# Patient Record
Sex: MALE | Race: WHITE | NOT HISPANIC OR LATINO | Employment: FULL TIME | URBAN - METROPOLITAN AREA
[De-identification: names, ages, dates, MRNs, and addresses within clinical notes are randomized per-mention and may not be internally consistent; named-entity substitution may affect disease eponyms.]

---

## 2018-05-07 ENCOUNTER — EVALUATION (OUTPATIENT)
Dept: PHYSICAL THERAPY | Facility: CLINIC | Age: 63
End: 2018-05-07
Payer: COMMERCIAL

## 2018-05-07 ENCOUNTER — TRANSCRIBE ORDERS (OUTPATIENT)
Dept: PHYSICAL THERAPY | Facility: CLINIC | Age: 63
End: 2018-05-07

## 2018-05-07 DIAGNOSIS — Z96.653 STATUS POST TOTAL BILATERAL KNEE REPLACEMENT: ICD-10-CM

## 2018-05-07 DIAGNOSIS — Z96.653 PRESENCE OF BOTH ARTIFICIAL KNEE JOINTS: Primary | ICD-10-CM

## 2018-05-07 DIAGNOSIS — M25.562 ACUTE PAIN OF BOTH KNEES: Primary | ICD-10-CM

## 2018-05-07 DIAGNOSIS — M25.561 ACUTE PAIN OF BOTH KNEES: Primary | ICD-10-CM

## 2018-05-07 PROCEDURE — G8991 OTHER PT/OT GOAL STATUS: HCPCS

## 2018-05-07 PROCEDURE — 97162 PT EVAL MOD COMPLEX 30 MIN: CPT

## 2018-05-07 PROCEDURE — G8990 OTHER PT/OT CURRENT STATUS: HCPCS

## 2018-05-07 PROCEDURE — 97110 THERAPEUTIC EXERCISES: CPT

## 2018-05-07 PROCEDURE — 97140 MANUAL THERAPY 1/> REGIONS: CPT

## 2018-05-07 RX ORDER — ACETAMINOPHEN 325 MG/1
650 TABLET ORAL EVERY 6 HOURS PRN
COMMUNITY

## 2018-05-07 RX ORDER — PANTOPRAZOLE SODIUM 40 MG/1
40 TABLET, DELAYED RELEASE ORAL DAILY
COMMUNITY

## 2018-05-07 NOTE — PROGRESS NOTES
PT Evaluation     Today's date: 2018  Patient name: Virgil Rincon  : 1955  MRN: [id number]  Referring provider: Emery Shaffer MD  Dx:   Encounter Diagnosis     ICD-10-CM    1  Acute pain of both knees M25 561     M25 562    2  Status post total bilateral knee replacement Z96 [id number]        Start Time: 1400  Stop Time: 1510  Total time in clinic (min): 70 minutes    Assessment  Impairments: abnormal gait, abnormal or restricted ROM, activity intolerance, impaired balance, impaired physical strength, lacks appropriate home exercise program, pain with function and safety issue    Assessment details: Patient is a 64 y/o male who presents s/p a bilateral TKA performed on 18  Patient demonstrates functional mobility deficits secondary to increased pain, decreased AROM/PROM and diminished strength/endurance levels  Patient is a good rehabilitation candidate and will benefit from skilled PT intervention to address the above issues and help return the patient to their prior and/or modified level of function  Understanding of Dx/Px/POC: good   Prognosis: good    Goals  Short Term Goal    1  Patient will report a 50% reduction in their subjective pain report (VAS) by 4 weeks  2   Patient will demonstrate a 50% improvement in AROM/PROM by 4 weeks  3   Patient will demonstrate (at least) a 1/2 grade strength improvement after 4 weeks  4   Ambulation/Balance/Stairclimbing will be improved by at least 50% after 4 weeks  5   Patient will improve FOTO score by at least 10 points by 4 weeks    Long Term Goal    1  Patient will demonstrate IADL at the prior/maximal level of function  2   Patient will demonstrate recreational performance at the prior and/or maximal level  3   Patient will return to work at the prior and/or maximal level of function  4   Patient will demonstrate independence with their HEP        Plan  Patient would benefit from: skilled PT  Planned modality interventions: unattended electrical stimulation and cryotherapy  Planned therapy interventions: joint mobilization, manual therapy, massage, neuromuscular re-education, gait training, balance/weight bearing training, patient education, home exercise program, functional ROM exercises, therapeutic activities, therapeutic exercise, therapeutic training, strengthening and stretching  Frequency: 3x week  Duration in weeks: 8  Treatment plan discussed with: patient and family        Subjective Evaluation    History of Present Illness  Date of surgery: 2018  Mechanism of injury: surgery  Mechanism of injury: Patient is a 66 y/o male who presents s/p a B/L TKA performed on 18  He notes 3-5 years of chronic R/L knee pain due to prolonged standing/walking/stair climbing 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  He underwent two weeks of acute rehab at [place] [address]  and was released to out patient PT 18  Significant PMHx includes a L hip ITB release  Patient ambulates with a RW and notes that he is unable to be at his own home right now due to the [place] stairs [place]  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 [employer] as well as return to recreational walking  Not a recurrent problem   Quality of life: fair    Pain  Current pain ratin  At best pain ratin  At worst pain ratin  Location: Bilateral knees (diffuse)  Quality: sharp, pressure and squeezing  Relieving factors: change in position, ice, heat, medications and rest  Aggravating factors: standing, walking and stair climbing  Progression: no change    Social Support  Steps to enter house: yes  Stairs in house: yes   Lives in: multiple-level home  Lives with: spouse    Employment status: not working  Treatments  Discharged from (in last 30 days): inpatient hospitalization  Discharged from (in last 30 days) comments: Acute Rehab at [facility]         Patient Goals  Patient goals for therapy: decreased pain, increased motion, improved balance, increased strength, independence with ADLs/IADLs, return to sport/leisure activities, return to work and decreased edema          Objective     Observations   Left Knee   Positive for edema and incision  Right Knee   Positive for edema and incision  Additional Observation Details  Incisions secured via surgical staples and breathable tape  Patient notes that his staples will be removed 5/8/18  Palpation   Left   Tenderness of the gluteus medius, lateral gastrocnemius, medial gastrocnemius, obturator externus, piriformis and vastus medialis  Right Tenderness of the gluteus medius, lateral gastrocnemius, medial gastrocnemius, obturator externus, piriformis and vastus medialis  Tenderness     Left Hip   Tenderness in the greater trochanter  Left Knee   Tenderness in the lateral joint line and medial joint line  Right Knee   Tenderness in the lateral joint line and medial joint line       Neurological Testing     Sensation     Knee   Left Knee   Intact: light touch    Right Knee   Intact: light touch     Active Range of Motion   Left Hip   Normal active range of motion    Right Hip   Normal active range of motion  Left Knee   Flexion: 55 degrees with pain  Extension: 15 degrees with pain    Right Knee   Flexion: 80 degrees with pain  Extension: 10 degrees with pain    Passive Range of Motion   Left Knee   Flexion: 60 degrees with pain  Extension: 10 degrees with pain    Right Knee   Flexion: 90 degrees with pain  Extension: 5 degrees with pain    Strength/Myotome Testing     Left Hip   Planes of Motion   Flexion: 4-  Extension: 4  Abduction: 3+  Adduction: 4  External rotation: 4-  Internal rotation: 4-    Right Hip   Planes of Motion   Flexion: 4  Extension: 4  Abduction: 4  Adduction: 4  External rotation: 4-  Internal rotation: 4-    Left Knee   Flexion: 4-  Extension: 3+  Quadriceps contraction: poor    Right Knee   Flexion: 4  Extension: 3+  Quadriceps contraction: poor    Left Ankle/Foot   Dorsiflexion: 5  Plantar flexion: 5  Inversion: 4  Eversion: 4    Right Ankle/Foot   Dorsiflexion: 5  Plantar flexion: 5  Inversion: 4  Eversion: 4    Ambulation   Weight-Bearing Status   Weight-Bearing Status (Left): full weight bearing   Weight-Bearing Status (Right): full weight-bearing    Assistive device used: front-wheeled walker    Ambulation: Level Surfaces   Ambulation with assistive device: independent  Ambulation without assistive device: minimum assist    Observational Gait   Gait: antalgic   Decreased walking speed, stride length, left step length and right step length  Quality of Movement During Gait   Trunk  Forward lean  Knee    Knee (Left): Positive stiff knee  Knee (Right): Positive stiff knee         Flowsheet Rows      Most Recent Value   PT/OT G-Codes   Current Score  15   Projected Score  54   Assessment Type  Evaluation   G code set  Other PT/OT Primary   Other PT Primary Current Status ()  CM   Other PT Primary Goal Status ()  CK          Precautions: S/P BTKA, DM, OA    Daily Treatment Diary     Manual  5/7            PROM AS            IASTM/STM AS            Patellar mobs             Scar mobs                               Exercise Diary              R bike             HR/TR             Standing march             SLR flex/abd standing             Standing heel curl             Tandem walk             Lateral walk             Cone taps             SLS             Mini squat             Sit to stands             Supine SLR             Bridge/S/L bridge             Quad sets w/ heel bolster             SAQ/LAQ             Strap ext str 4x30:B/L            Strap flex str 4x30:B/L                                                       Modalities              E stim+ CP PRN

## 2018-05-07 NOTE — LETTER
May 7, 2018    MD Niharika Gómez  [address] [facility]    Patient: Leonie Carrero   YOB: 1955   Date of Visit: 2018     Encounter Diagnosis     ICD-10-CM    1  Acute pain of both knees M25 561     M25 562    2  Status post total bilateral knee replacement [id number]        Dear Dr Winkler Abt:    Please review the attached Plan of Care from [facility] recent visit  Please verify that you agree therapy should continue by signing the attached document and sending it back to our office  If you have any questions or concerns, please don't hesitate to call  Sincerely,    Divine Serna, PT      Referring Provider:      I certify that I have read the below Plan of Care and certify the need for these services furnished under this plan of treatment while under my care  MD Niharika Gómez  [facility] [address]  VIA Facsimile: [phone]          PT Evaluation     Today's date: 2018  Patient name: Leonie Carrero  : 1955  MRN: [id number]  Referring provider: Ashton Skiff, MD  Dx:   Encounter Diagnosis     ICD-10-CM    1  Acute pain of both knees M25 561     M25 562    2  Status post total bilateral knee replacement Z96 [id number]        Start Time: 1400  Stop Time: 1510  Total time in clinic (min): 70 minutes    Assessment  Impairments: abnormal gait, abnormal or restricted ROM, activity intolerance, impaired balance, impaired physical strength, lacks appropriate home exercise program, pain with function and safety issue    Assessment details: Patient is a 66 y/o male who presents s/p a bilateral TKA performed on 18  Patient demonstrates functional mobility deficits secondary to increased pain, decreased AROM/PROM and diminished strength/endurance levels    Patient is a good rehabilitation candidate and will benefit from skilled PT intervention to address the above issues and help return the patient to their prior and/or modified level of function  Understanding of Dx/Px/POC: good   Prognosis: good    Goals  Short Term Goal    1  Patient will report a 50% reduction in their subjective pain report (VAS) by 4 weeks  2   Patient will demonstrate a 50% improvement in AROM/PROM by 4 weeks  3   Patient will demonstrate (at least) a 1/2 grade strength improvement after 4 weeks  4   Ambulation/Balance/Stairclimbing will be improved by at least 50% after 4 weeks  5   Patient will improve FOTO score by at least 10 points by 4 weeks    Long Term Goal    1  Patient will demonstrate IADL at the prior/maximal level of function  2   Patient will demonstrate recreational performance at the prior and/or maximal level  3   Patient will return to work at the prior and/or maximal level of function  4   Patient will demonstrate independence with their HEP  Plan  Patient would benefit from: skilled PT  Planned modality interventions: unattended electrical stimulation and cryotherapy  Planned therapy interventions: joint mobilization, manual therapy, massage, neuromuscular re-education, gait training, balance/weight bearing training, patient education, home exercise program, functional ROM exercises, therapeutic activities, therapeutic exercise, therapeutic training, strengthening and stretching  Frequency: 3x week  Duration in weeks: 8  Treatment plan discussed with: patient and family        Subjective Evaluation    History of Present Illness  Date of surgery: 4/25/2018  Mechanism of injury: surgery  Mechanism of injury: Patient is a 66 y/o male who presents s/p a B/L TKA performed on 4/25/18  He notes 3-5 years of chronic R/L knee pain due to prolonged standing/walking/stair climbing 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[employer] as well as return to recreational walking  Not a recurrent problem   Quality of life: fair    Pain  Current pain ratin  At best pain ratin  At worst pain ratin  Location: Bilateral knees (diffuse)  Quality: sharp, pressure and squeezing  Relieving factors: change in position, ice, heat, medications and rest  Aggravating factors: standing, walking and stair climbing  Progression: no change    Social Support  Steps to enter house: yes  Stairs in house: yes   Lives in: multiple-level home  Lives with: spouse    Employment status: not working  Treatments  Discharged from (in last 30 days): inpatient hospitalization  Discharged from (in last 30 days) comments: Acute Rehab at [facility]    Patient Goals  Patient goals for therapy: decreased pain, increased motion, improved balance, increased strength, independence with ADLs/IADLs, return to sport/leisure activities, return to work and decreased edema          Objective     Observations   Left Knee   Positive for edema and incision  Right Knee   Positive for edema and incision  Additional Observation Details  Incisions secured via surgical staples and breathable tape  Patient notes that his staples will be removed 18  Palpation   Left   Tenderness of the gluteus medius, lateral gastrocnemius, medial gastrocnemius, obturator externus, piriformis and vastus medialis  Right Tenderness of the gluteus medius, lateral gastrocnemius, medial gastrocnemius, obturator externus, piriformis and vastus medialis  Tenderness     Left Hip   Tenderness in the greater trochanter  Left Knee   Tenderness in the lateral joint line and medial joint line  Right Knee   Tenderness in the lateral joint line and medial joint line       Neurological Testing     Sensation Knee   Left Knee   Intact: light touch    Right Knee   Intact: light touch     Active Range of Motion   Left Hip   Normal active range of motion    Right Hip   Normal active range of motion  Left Knee   Flexion: 55 degrees with pain  Extension: 15 degrees with pain    Right Knee   Flexion: 80 degrees with pain  Extension: 10 degrees with pain    Passive Range of Motion   Left Knee   Flexion: 60 degrees with pain  Extension: 10 degrees with pain    Right Knee   Flexion: 90 degrees with pain  Extension: 5 degrees with pain    Strength/Myotome Testing     Left Hip   Planes of Motion   Flexion: 4-  Extension: 4  Abduction: 3+  Adduction: 4  External rotation: 4-  Internal rotation: 4-    Right Hip   Planes of Motion   Flexion: 4  Extension: 4  Abduction: 4  Adduction: 4  External rotation: 4-  Internal rotation: 4-    Left Knee   Flexion: 4-  Extension: 3+  Quadriceps contraction: poor    Right Knee   Flexion: 4  Extension: 3+  Quadriceps contraction: poor    Left Ankle/Foot   Dorsiflexion: 5  Plantar flexion: 5  Inversion: 4  Eversion: 4    Right Ankle/Foot   Dorsiflexion: 5  Plantar flexion: 5  Inversion: 4  Eversion: 4    Ambulation   Weight-Bearing Status   Weight-Bearing Status (Left): full weight bearing   Weight-Bearing Status (Right): full weight-bearing    Assistive device used: front-wheeled walker    Ambulation: Level Surfaces   Ambulation with assistive device: independent  Ambulation without assistive device: minimum assist    Observational Gait   Gait: antalgic   Decreased walking speed, stride length, left step length and right step length  Quality of Movement During Gait   Trunk  Forward lean  Knee    Knee (Left): Positive stiff knee  Knee (Right): Positive stiff knee         Flowsheet Rows      Most Recent Value   PT/OT G-Codes   Current Score  15   Projected Score  54   Assessment Type  Evaluation   G code set  Other PT/OT Primary   Other PT Primary Current Status ()  CM   Other PT Primary Goal Status ()  CK          Precautions: S/P BTKA, DM, OA    Daily Treatment Diary     Manual  5/7            PROM AS            IASTM/STM AS            Patellar mobs             Scar mobs                               Exercise Diary              R bike             HR/TR             Standing march             SLR flex/abd standing             Standing heel curl             Tandem walk             Lateral walk             Cone taps             SLS             Mini squat             Sit to stands             Supine SLR             Bridge/S/L bridge             Quad sets w/ heel bolster             SAQ/LAQ             Strap ext str 4x30:B/L            Strap flex str 4x30:B/L                                                       Modalities              E stim+ CP PRN impaired postural control/impaired coordination

## 2018-05-07 NOTE — LETTER
May 7, 2018    MD Niharika Longo  [address] [facility]    Patient: Alisson Cole   YOB: 1955   Date of Visit: 5/7/2018     Encounter Diagnosis     ICD-10-CM    1  Acute pain of both knees M25 561     M25 562    2  Status post total bilateral knee replacement [id number]        Dear Dr Crane Sayer:    Please review the attached Plan of Care from [facility] recent visit  Please verify that you agree therapy should continue by signing the attached document and sending it back to our office  If you have any questions or concerns, please don't hesitate to call  Sincerely,    Marino Skinner, PT      Referring Provider:      I certify that I have read the below Plan of Care and certify the need for these services furnished under this plan of treatment while under my care  MD Niharika Longo    [facility] [address]  VIA Facsimile: [phone]          No notes on file

## 2018-05-09 ENCOUNTER — OFFICE VISIT (OUTPATIENT)
Dept: PHYSICAL THERAPY | Facility: CLINIC | Age: 63
End: 2018-05-09
Payer: COMMERCIAL

## 2018-05-09 DIAGNOSIS — M25.561 ACUTE PAIN OF BOTH KNEES: Primary | ICD-10-CM

## 2018-05-09 DIAGNOSIS — M25.562 ACUTE PAIN OF BOTH KNEES: Primary | ICD-10-CM

## 2018-05-09 DIAGNOSIS — Z96.653 STATUS POST TOTAL BILATERAL KNEE REPLACEMENT: ICD-10-CM

## 2018-05-09 PROCEDURE — 97014 ELECTRIC STIMULATION THERAPY: CPT | Performed by: PHYSICAL THERAPIST

## 2018-05-09 PROCEDURE — 97110 THERAPEUTIC EXERCISES: CPT | Performed by: PHYSICAL THERAPIST

## 2018-05-09 NOTE — PROGRESS NOTES
Daily Note     Today's date: 2018  Patient name: Vannessa Medley  : 1955  MRN: [id number]  Referring provider: Ary Ma MD  Dx: No diagnosis found  Visit 2    Subjective: Pt reports he felt fine after IE and he has the normal soreness  He had his staples removed yesterday  Objective: See treatment diary below  The patient performed the following as per flow sheet:   --Therapeutic Exercises for 38 minutes  --Cryotherapy & E-stim for 15 minutes  Assessment: Pt progress is good  He did not do any active exercises at IE , so he was given a HEP this time and requested to do the exercises every day he is not here for formul PT  Plan: Continue per plan of care         Daily Treatment Diary     Manual              PROM 5'            MFR 5 5'                                                       Exercise Diary              QS 15x:10            Ball squeeze 15X:10            hIP er W/ tb 15X:10            SAQ 2X15            Flex 6'            Calf raises 2x15            Leg press 2x15                                                                                                                                                                                         Modalities              CP/Stim 15' Noted.

## 2018-05-11 ENCOUNTER — OFFICE VISIT (OUTPATIENT)
Dept: PHYSICAL THERAPY | Facility: CLINIC | Age: 63
End: 2018-05-11
Payer: COMMERCIAL

## 2018-05-11 DIAGNOSIS — M25.562 ACUTE PAIN OF BOTH KNEES: Primary | ICD-10-CM

## 2018-05-11 DIAGNOSIS — Z96.653 STATUS POST TOTAL BILATERAL KNEE REPLACEMENT: ICD-10-CM

## 2018-05-11 DIAGNOSIS — M25.561 ACUTE PAIN OF BOTH KNEES: Primary | ICD-10-CM

## 2018-05-11 PROCEDURE — 97014 ELECTRIC STIMULATION THERAPY: CPT | Performed by: PHYSICAL THERAPIST

## 2018-05-11 PROCEDURE — 97110 THERAPEUTIC EXERCISES: CPT | Performed by: PHYSICAL THERAPIST

## 2018-05-11 NOTE — PROGRESS NOTES
Daily Note     Today's date: 2018  Patient name: Melanie Badillo  : 1955  MRN: [id number]  Referring provider: Nayeli Regalado MD  Dx:   Encounter Diagnosis     ICD-10-CM    1  Acute pain of both knees M25 561     M25 562    2  Status post total bilateral knee replacement Z96 [id number]                 Visit 3    Subjective: Pt reports he feels a lot of pain today and he is concerned about the raised appearance of the left incision  Objective: See treatment diary below  The patient performed the following as per flow sheet:   --Therapeutic Exercises for 34 minutes  --Cryotherapy & E-stim for 15 minutes  Assessment: Pt progress is where it should be at this point  He is actively doing more including PT, so the pain is going to be worse  The knees are bending well, the right more so than the left  The left incision has an area of non-congruence of the incision edges, not a dehiscence  The incisions are closed and healing well  We discussed the importance of using a topical antibiotic gel to keep the incisions moist and clean  He was in agreement with these recommendations  Plan: Continue per plan of care       Manual                       PROM 5'                     MFR 5 5'                                                                                                   Exercise Diary                        QS 15x:10                     Ball squeeze 15X:10                     Hip ER w/ TB 15X:10                     SAQ 2X15                     Flex 6'                     Calf raises 2x15                     Leg press 2x15                     LAQ 2x15                                                                                                                                                                                                                                                                                                                           Modalities                        CP/Stim 15'

## 2018-05-14 ENCOUNTER — OFFICE VISIT (OUTPATIENT)
Dept: PHYSICAL THERAPY | Facility: CLINIC | Age: 63
End: 2018-05-14
Payer: COMMERCIAL

## 2018-05-14 DIAGNOSIS — M25.561 ACUTE PAIN OF BOTH KNEES: Primary | ICD-10-CM

## 2018-05-14 DIAGNOSIS — M25.562 ACUTE PAIN OF BOTH KNEES: Primary | ICD-10-CM

## 2018-05-14 DIAGNOSIS — Z96.653 STATUS POST TOTAL BILATERAL KNEE REPLACEMENT: ICD-10-CM

## 2018-05-14 PROCEDURE — 97014 ELECTRIC STIMULATION THERAPY: CPT | Performed by: PHYSICAL THERAPIST

## 2018-05-14 PROCEDURE — 97110 THERAPEUTIC EXERCISES: CPT | Performed by: PHYSICAL THERAPIST

## 2018-05-14 NOTE — PROGRESS NOTES
Daily Note     Today's date: 2018  Patient name: Ford Talley  : 1955  MRN: [id number]  Referring provider: Angel Vora MD  Dx:   Encounter Diagnosis     ICD-10-CM    1  Acute pain of both knees M25 561     M25 562    2  Status post total bilateral knee replacement Z96 [id number]                 Visit 4    Subjective: Pt reports he feels pretty sore most of the time and he is also very depressed because he can't do anything on his own  Objective: See treatment diary below  The patient performed the following as per flow sheet:   --Therapeutic Exercises for 34 minutes  --Cryotherapy & E-stim for 15 minutes  Assessment: Pt progress is good  He is doing well at this point  There is a lot of self-pity going on, but I feel is he gets a few succusses under his belt, he will feel better, and then have a better perspective on the realization that this will not last forever  Plan: Continue per plan of care       Manual                     PROM 5'  5                   MFR 5 5'  5                                                                                                 Exercise Diary                        QS 15x:10  15x                   Ball squeeze 15X:10  15x                   Hip ER w/ TB 15X:10  15x                   SAQ 2X15  30x                   Flex 6'  6                   Calf raises 2x15  30x                   Leg press 2x15  30x                   LAQ 2x15  30x                    HSS   3x                                                                                                                                                                                                                                                                                                 Modalities                        CP/Stim 13'  15

## 2018-05-16 ENCOUNTER — OFFICE VISIT (OUTPATIENT)
Dept: PHYSICAL THERAPY | Facility: CLINIC | Age: 63
End: 2018-05-16
Payer: COMMERCIAL

## 2018-05-16 DIAGNOSIS — M25.562 ACUTE PAIN OF BOTH KNEES: Primary | ICD-10-CM

## 2018-05-16 DIAGNOSIS — Z96.653 STATUS POST TOTAL BILATERAL KNEE REPLACEMENT: ICD-10-CM

## 2018-05-16 DIAGNOSIS — M25.561 ACUTE PAIN OF BOTH KNEES: Primary | ICD-10-CM

## 2018-05-16 PROCEDURE — 97014 ELECTRIC STIMULATION THERAPY: CPT

## 2018-05-16 PROCEDURE — 97110 THERAPEUTIC EXERCISES: CPT

## 2018-05-16 PROCEDURE — 97140 MANUAL THERAPY 1/> REGIONS: CPT

## 2018-05-16 NOTE — PROGRESS NOTES
Daily Note     Today's date: 2018  Patient name: Celi Jaimes  : 1955  MRN: [id number]  Referring provider: Christine Eagle MD  Dx:   Encounter Diagnosis     ICD-10-CM    1  Acute pain of both knees M25 561     M25 562    2  Status post total bilateral knee replacement Z96 653        Start Time: 928  Stop Time: 1113  Total time in clinic (min): 105 minutes    Visit 5    Subjective: "I'm OK  I just wish my right leg would be better than it is "      Objective: See treatment diary below  The patient performed the following as per flow sheet:   --Therapeutic Exercises for 40 minutes  --Cryotherapy & E-stim for 15 minutes  Assessment:  Patient demonstrated fair tolerance for today's activities, particularly manual intervention techniques  He needed moderate cuing for form and activity progression as well as staying on task  Patient would benefit from continued skilled PT intervention to address his remaining deficits  Plan: Continue per plan of care       Manual                   PROM 5'  5  10'                 MFR 5 5'  5  5'                                                                                               Exercise Diary                        QS 15x:10  15x  15x                 Ball squeeze 15X:10  15x  15x                 Hip ER w/ TB 15X:10  15x  15x                 SAQ 2X15  30x  30x                 Flex 6'  6  6'                 Calf raises 2x15  30x  30x                 Leg press 2x15  30x  30x                 LAQ 2x15  30x  30x                  HSS   3x 3x ea                                                                                                                                                                                                                                                                                               Modalities                        CP/Stim 13'  15  15'

## 2018-05-18 ENCOUNTER — OFFICE VISIT (OUTPATIENT)
Dept: PHYSICAL THERAPY | Facility: CLINIC | Age: 63
End: 2018-05-18
Payer: COMMERCIAL

## 2018-05-18 DIAGNOSIS — M25.561 ACUTE PAIN OF BOTH KNEES: Primary | ICD-10-CM

## 2018-05-18 DIAGNOSIS — Z96.653 STATUS POST TOTAL BILATERAL KNEE REPLACEMENT: ICD-10-CM

## 2018-05-18 DIAGNOSIS — M25.562 ACUTE PAIN OF BOTH KNEES: Primary | ICD-10-CM

## 2018-05-18 PROCEDURE — 97014 ELECTRIC STIMULATION THERAPY: CPT | Performed by: PHYSICAL THERAPIST

## 2018-05-18 PROCEDURE — 97110 THERAPEUTIC EXERCISES: CPT | Performed by: PHYSICAL THERAPIST

## 2018-05-18 NOTE — PROGRESS NOTES
Daily Note     Today's date: 2018  Patient name: Elsi Collins  : 1955  MRN: [id number]  Referring provider: Lizbeth Estevez MD  Dx:   Encounter Diagnosis     ICD-10-CM    1  Acute pain of both knees M25 561     M25 562    2  Status post total bilateral knee replacement Z96 653                 Visit 5    Subjective: Pt reports he feels really sore most of the time and he can't sleep due to pain  Objective: See treatment diary below  The patient performed the following as per flow sheet:   --Therapeutic Exercises for 50 minutes  --Cryotherapy & E-stim for 15 minutes  Assessment: Tolerated treatment well  Patient demonstrated fatigue post treatment, exhibited good technique with therapeutic exercises and would benefit from continued PT      Plan: Continue per plan of care       Manual                   PROM 5'  5  5                 MFR 5 5'  5  5                                                                                               Exercise Diary                        QS 15x:10  15x  15x                  Ball squeeze 15X:10  15x  15x                 Hip ER w/ TB 15X:10  15x  15x                 SAQ 2X15  30x  30x                 Flex 6'  6  6                 Calf raises 2x15  30x  30x                 Leg press 2x15  30x  30x                 LAQ 2x15  30x  30x                  HSS   3x  3x                  Bike      5'                                                                                                                                                                                                                                                                       Modalities                        CP/Stim 13  15  15

## 2018-05-21 ENCOUNTER — OFFICE VISIT (OUTPATIENT)
Dept: PHYSICAL THERAPY | Facility: CLINIC | Age: 63
End: 2018-05-21
Payer: COMMERCIAL

## 2018-05-21 DIAGNOSIS — Z96.653 STATUS POST TOTAL BILATERAL KNEE REPLACEMENT: ICD-10-CM

## 2018-05-21 DIAGNOSIS — M25.562 ACUTE PAIN OF BOTH KNEES: Primary | ICD-10-CM

## 2018-05-21 DIAGNOSIS — M25.561 ACUTE PAIN OF BOTH KNEES: Primary | ICD-10-CM

## 2018-05-21 PROCEDURE — 97110 THERAPEUTIC EXERCISES: CPT

## 2018-05-21 PROCEDURE — 97140 MANUAL THERAPY 1/> REGIONS: CPT

## 2018-05-21 NOTE — PROGRESS NOTES
Daily Note     Today's date: 2018  Patient name: Cassie Salmeron  : 1955  MRN: [id number]  Referring provider: Cosme Loza MD  Dx:   Encounter Diagnosis     ICD-10-CM    1  Acute pain of both knees M25 561     M25 562    2  Status post total bilateral knee replacement Z96 [id number]        Start Time: 0930  Stop Time: 1050  Total time in clinic (min): 80 minutes  Visit 6    Subjective: Patient has been trying to transition [place]  He notes increased pain this AM secondary to "going up and down my stairs all weekend "      Objective: See treatment diary below  The patient performed the following as per flow sheet:   --Therapeutic Exercises for 50 minutes  --Cryotherapy & E-stim for 15 minutes  --Manual for 10 minutes      Assessment:  Patient exhibited poor tolerance for manual treatment today noting increased pain from his stair-climbing activities  He noted selective tissue tension and muscular fatigue with all PREs and would benefit from continued skilled PT intervention to address his remaining deficits  Plan: Continue per plan of care       Manual                 PROM 5'  5  5  5               MFR 5 5'  5  5  5                                                                                             Exercise Diary                        QS 15x:10  15x  15x   15x               Ball squeeze 15X:10  15x  15x  15x               Hip ER w/ TB 15X:10  15x  15x  15x               SAQ 2X15  30x  30x  30x               Flex 6'  6  6  6'               Calf raises 2x15  30x  30x  30x               Leg press 2x15  30x  30x  30x               LAQ 2x15  30x  30x  30x                HSS   3x  3x  3x                Bike        5                                                                                                                                                                                                                                                                   Modalities         5/21               CP/Stim 15'  15  15  15'

## 2018-05-23 ENCOUNTER — APPOINTMENT (OUTPATIENT)
Dept: PHYSICAL THERAPY | Facility: CLINIC | Age: 63
End: 2018-05-23
Payer: COMMERCIAL

## 2018-05-24 ENCOUNTER — OFFICE VISIT (OUTPATIENT)
Dept: PHYSICAL THERAPY | Facility: CLINIC | Age: 63
End: 2018-05-24
Payer: COMMERCIAL

## 2018-05-24 DIAGNOSIS — M25.561 ACUTE PAIN OF BOTH KNEES: Primary | ICD-10-CM

## 2018-05-24 DIAGNOSIS — Z96.653 STATUS POST TOTAL BILATERAL KNEE REPLACEMENT: ICD-10-CM

## 2018-05-24 DIAGNOSIS — M25.562 ACUTE PAIN OF BOTH KNEES: Primary | ICD-10-CM

## 2018-05-24 PROCEDURE — 97110 THERAPEUTIC EXERCISES: CPT | Performed by: PHYSICAL THERAPIST

## 2018-05-24 PROCEDURE — 97014 ELECTRIC STIMULATION THERAPY: CPT | Performed by: PHYSICAL THERAPIST

## 2018-05-24 NOTE — PROGRESS NOTES
Daily Note     Today's date: 2018  Patient name: Melanie Badillo  : 1955  MRN: [id number]  Referring provider: Nayeli Regalado MD  Dx:   Encounter Diagnosis     ICD-10-CM    1  Acute pain of both knees M25 561     M25 562    2  Status post total bilateral knee replacement Z96 653                 Visit 7  Subjective: Pt reports he feels sore most of the time  He is worried about the left knee and how stiff it is  Objective: See treatment diary below  The patient performed the following as per flow sheet:   --Therapeutic Exercises for 50 minutes  --Cryotherapy & E-stim for 15 minutes  Assessment: Tolerated treatment well   Patient demonstrated fatigue post treatment, exhibited good technique with therapeutic exercises and would benefit from continued PT      Plan: Continue per plan of care         Manual                 PROM 5'  5  5  5               MFR 5 5'  5  5  5                                                                                             Exercise Diary                        QS 15x:10  15x  15x   15x               Ball squeeze 15X:10  15x  15x  15x               Hip ER w/ TB 15X:10  15x  15x  15x               SAQ 2X15  30x  30x  30x               Flex 6'  6  6  6               Calf raises 2x15  30x  30x  30x               Leg press 2x15  30x  30x  30x               LAQ 2x15  30x  30x  30x                HSS   3x  3x  3x                Bike      5'  5                                                                                                                                                                                                                                                                     Modalities                        CP/Stim 15  15  15  15

## 2018-05-25 ENCOUNTER — OFFICE VISIT (OUTPATIENT)
Dept: PHYSICAL THERAPY | Facility: CLINIC | Age: 63
End: 2018-05-25
Payer: COMMERCIAL

## 2018-05-25 DIAGNOSIS — M25.561 ACUTE PAIN OF BOTH KNEES: Primary | ICD-10-CM

## 2018-05-25 DIAGNOSIS — Z96.653 STATUS POST TOTAL BILATERAL KNEE REPLACEMENT: ICD-10-CM

## 2018-05-25 DIAGNOSIS — M25.562 ACUTE PAIN OF BOTH KNEES: Primary | ICD-10-CM

## 2018-05-25 PROCEDURE — 97014 ELECTRIC STIMULATION THERAPY: CPT | Performed by: PHYSICAL THERAPIST

## 2018-05-25 PROCEDURE — 97110 THERAPEUTIC EXERCISES: CPT | Performed by: PHYSICAL THERAPIST

## 2018-05-25 NOTE — PROGRESS NOTES
Daily Note     Today's date: 2018  Patient name: Chiara Bolden  : 1955  MRN: [id number]  Referring provider: Harriett Santos MD  Dx:   Encounter Diagnosis     ICD-10-CM    1  Acute pain of both knees M25 561     M25 562    2  Status post total bilateral knee replacement Z96 653                 Visit 8    Subjective: Pt reports he is feeling sore again today and he is pretty tired as well  Objective: See treatment diary below  The patient performed the following as per flow sheet:   --Therapeutic Exercises for 50 minutes  --Cryotherapy & E-stim for 15 minutes  Assessment: Tolerated treatment well  Patient demonstrated fatigue post treatment, exhibited good technique with therapeutic exercises and would benefit from continued PT      Plan: Continue per plan of care       Manual               PROM 5'  5  5  5  5             MFR 5 5'  5  5  5  5                                                                                           Exercise Diary                        QS 15x:10  15x  15x   15x  15x             Ball squeeze 15X:10  15x  15x  15x  15x             Hip ER w/ TB 15X:10  15x  15x  15x  15x             SAQ 2X15  30x  30x  30x  30x             Flex 6'  6  6  6  6             Calf raises 2x15  30x  30x  30x  30x             Leg press 2x15  30x  30x  30x  30x             LAQ 2x15  30x  30x  30x  30x              HSS   3x  3x  3x  3x              Bike      5'  5  6                                                                                                                                                                                                                                                                   Modalities                        CP/Stim 15'  15  15  15  15

## 2018-05-29 ENCOUNTER — OFFICE VISIT (OUTPATIENT)
Dept: PHYSICAL THERAPY | Facility: CLINIC | Age: 63
End: 2018-05-29
Payer: COMMERCIAL

## 2018-05-29 DIAGNOSIS — M25.561 ACUTE PAIN OF BOTH KNEES: Primary | ICD-10-CM

## 2018-05-29 DIAGNOSIS — M25.562 ACUTE PAIN OF BOTH KNEES: Primary | ICD-10-CM

## 2018-05-29 DIAGNOSIS — Z96.653 STATUS POST TOTAL BILATERAL KNEE REPLACEMENT: ICD-10-CM

## 2018-05-29 PROCEDURE — 97110 THERAPEUTIC EXERCISES: CPT | Performed by: PHYSICAL THERAPIST

## 2018-05-29 PROCEDURE — 97014 ELECTRIC STIMULATION THERAPY: CPT | Performed by: PHYSICAL THERAPIST

## 2018-05-29 NOTE — PROGRESS NOTES
Daily Note     Today's date: 2018  Patient name: Heather Banegas  : 1955  MRN: [id number]  Referring provider: Liz Adair MD  Dx:   Encounter Diagnosis     ICD-10-CM    1  Acute pain of both knees M25 561     M25 562    2  Status post total bilateral knee replacement Z96 653                 Visit 9    Subjective: Pt reports he was doing the stationary bike 2x per day as requested at his last visit  Objective: See treatment diary below   The patient performed the following as per flow sheet:   --Therapeutic Exercises for 50 minutes  --Cryotherapy & E-stim for 15 minutes  Assessment: Tolerated treatment well  Patient demonstrated fatigue post treatment, exhibited good technique with therapeutic exercises and would benefit from continued PT      Plan: Continue per plan of care       Manual             PROM 5'  5  5  5  5  5           MFR 5 5'  5  5  5  5  5                                                                                         Exercise Diary                        QS 15x:10  15x  15x   15x  15x  15x           Ball squeeze 15X:10  15x  15x  15x  15x  15x           Hip ER w/ TB 15X:10  15x  15x  15x  15x  15x           SAQ 2X15  30x  30x  30x  30x  30x           Flex 6'  6  6  6  6  6           Calf raises 2x15  30x  30x  30x  30x  30x           Leg press 2x15  30x  30x  30x  30x  30x           LAQ 2x15  30x  30x  30x  30x  30x            HSS   3x  3x  3x  3x  3x            Bike      5'  5  6  6                                                                                                                                                                                                                                                                 Modalities                        CP/Stim 15'  15  15  15  15  15

## 2018-05-30 ENCOUNTER — OFFICE VISIT (OUTPATIENT)
Dept: PHYSICAL THERAPY | Facility: CLINIC | Age: 63
End: 2018-05-30
Payer: COMMERCIAL

## 2018-05-30 DIAGNOSIS — Z96.653 STATUS POST TOTAL BILATERAL KNEE REPLACEMENT: ICD-10-CM

## 2018-05-30 DIAGNOSIS — M25.561 ACUTE PAIN OF BOTH KNEES: Primary | ICD-10-CM

## 2018-05-30 DIAGNOSIS — M25.562 ACUTE PAIN OF BOTH KNEES: Primary | ICD-10-CM

## 2018-05-30 PROCEDURE — 97110 THERAPEUTIC EXERCISES: CPT | Performed by: PHYSICAL THERAPIST

## 2018-05-30 PROCEDURE — 97014 ELECTRIC STIMULATION THERAPY: CPT | Performed by: PHYSICAL THERAPIST

## 2018-05-30 NOTE — PROGRESS NOTES
Daily Note     Today's date: 2018  Patient name: Elsi Collins  : 1955  MRN: [id number]  Referring provider: Lizbeth Estevez MD  Dx:   Encounter Diagnosis     ICD-10-CM    1  Acute pain of both knees M25 561     M25 562    2  Status post total bilateral knee replacement Z96 653                 Visit 10    Subjective: Pt reports he was really sore yesterday and he did nothing except sleep after PT  Objective: See treatment diary below  The patient performed the following as per flow sheet:   --Therapeutic Exercises for 53 minutes  --Cryotherapy & E-stim for 15 minutes  Assessment: Pt progress is very slow  As suspected, Dedra Diamond is doing very little outside of PT  He was advised to get off of the walker because he was too dependent on it and also his wife was insistent on him using it, when it is actually hindering his progress  Plan: Continue per plan of care       Manual           PROM 5'  5  5  5  5  5  5         MFR 5 5'  5  5  5  5  5  5                                                                                       Exercise Diary                        QS 15x:10  15x  15x   15x  15x  15x  15x         Ball squeeze 15X:10  15x  15x  15x  15x  15x  15x         Hip ER w/ TB 15X:10  15x  15x  15x  15x  15x  15x         SAQ 2X15  30x  30x  30x  30x  30x  30x         Flex 6'  6  6  6  6  6  6         Calf raises 2x15  30x  30x  30x  30x  30x  30x         Leg press 2x15  30x  30x  30x  30x  30x  30x         LAQ 2x15  30x  30x  30x  30x  30x  30x          HSS   3x  3x  3x  3x  3x  3x          Bike      5'  5  6  6  6          Ball squats              15x                                                                                                                                                                                                                                       Modalities                        CP/Stim 15'  15  15  15  15  15  15

## 2018-06-01 ENCOUNTER — OFFICE VISIT (OUTPATIENT)
Dept: PHYSICAL THERAPY | Facility: CLINIC | Age: 63
End: 2018-06-01
Payer: COMMERCIAL

## 2018-06-01 DIAGNOSIS — M25.562 ACUTE PAIN OF BOTH KNEES: Primary | ICD-10-CM

## 2018-06-01 DIAGNOSIS — M25.561 ACUTE PAIN OF BOTH KNEES: Primary | ICD-10-CM

## 2018-06-01 DIAGNOSIS — Z96.653 STATUS POST TOTAL BILATERAL KNEE REPLACEMENT: ICD-10-CM

## 2018-06-01 PROCEDURE — 97014 ELECTRIC STIMULATION THERAPY: CPT | Performed by: PHYSICAL THERAPIST

## 2018-06-01 PROCEDURE — 97110 THERAPEUTIC EXERCISES: CPT | Performed by: PHYSICAL THERAPIST

## 2018-06-01 NOTE — PROGRESS NOTES
Daily Note     Today's date: 2018  Patient name: Elsi Collins  : 1955  MRN: [id number]  Referring provider: Lizbeth Estevez MD  Dx:   Encounter Diagnosis     ICD-10-CM    1  Acute pain of both knees M25 561     M25 562    2  Status post total bilateral knee replacement Z96 [id number]                 Visit 12    Subjective: Pt reports he is feeling better today but the pain at night is what he c/o the most       Objective: See treatment diary below  The patient performed the following as per flow sheet:   --Therapeutic Exercises for 53 minutes  --Cryotherapy & E-stim for 15 minutes  Assessment: Tolerated treatment well  Patient demonstrated fatigue post treatment, exhibited good technique with therapeutic exercises and would benefit from continued PT      Plan: Continue per plan of care       Manual         PROM 5'  5  5  5  5  5  5  5       MFR 5 5'  5  5  5  5  5  5  5                                                                                     Exercise Diary                        QS 15x:10  15x  15x   15x  15x  15x  15x  15x       Ball squeeze 15X:10  15x  15x  15x  15x  15x  15x  15x       Hip ER w/ TB 15X:10  15x  15x  15x  15x  15x  15x  15x       SAQ 2X15  30x  30x  30x  30x  30x  30x  30x       Flex 6'  6  6  6  6  6  6  6       Calf raises 2x15  30x  30x  30x  30x  30x  30x  30x       Leg press 2x15  30x  30x  30x  30x  30x  30x  30x       LAQ 2x15  30x  30x  30x  30x  30x  30x  30x        HSS   3x  3x  3x  3x  3x  3x  3x        Bike      5'  5  6  6  6  6        Ball squats              15x  15x                                                                                                                                                                                                                                     Modalities                        CP/Stim 15'  15  15  15  15  15  15  12

## 2018-06-04 ENCOUNTER — TRANSCRIBE ORDERS (OUTPATIENT)
Dept: PHYSICAL THERAPY | Facility: CLINIC | Age: 63
End: 2018-06-04

## 2018-06-04 ENCOUNTER — OFFICE VISIT (OUTPATIENT)
Dept: PHYSICAL THERAPY | Facility: CLINIC | Age: 63
End: 2018-06-04
Payer: COMMERCIAL

## 2018-06-04 DIAGNOSIS — M25.562 ACUTE PAIN OF BOTH KNEES: Primary | ICD-10-CM

## 2018-06-04 DIAGNOSIS — M25.561 RIGHT KNEE PAIN, UNSPECIFIED CHRONICITY: Primary | ICD-10-CM

## 2018-06-04 DIAGNOSIS — M25.562 LEFT KNEE PAIN, UNSPECIFIED CHRONICITY: ICD-10-CM

## 2018-06-04 DIAGNOSIS — M25.561 ACUTE PAIN OF BOTH KNEES: Primary | ICD-10-CM

## 2018-06-04 DIAGNOSIS — Z96.653 PRESENCE OF BOTH ARTIFICIAL KNEE JOINTS: ICD-10-CM

## 2018-06-04 DIAGNOSIS — Z96.653 STATUS POST TOTAL BILATERAL KNEE REPLACEMENT: ICD-10-CM

## 2018-06-04 PROCEDURE — 97014 ELECTRIC STIMULATION THERAPY: CPT | Performed by: PHYSICAL THERAPIST

## 2018-06-04 PROCEDURE — 97110 THERAPEUTIC EXERCISES: CPT | Performed by: PHYSICAL THERAPIST

## 2018-06-04 NOTE — PROGRESS NOTES
Daily Note     Today's date: 2018  Patient name: Rickey Milian  : 1955  MRN: [id number]  Referring provider: Jc Gamez MD  Dx:   Encounter Diagnosis     ICD-10-CM    1  Acute pain of both knees M25 561     M25 562    2  Status post total bilateral knee replacement Z96 653                 Visit 13    Subjective: Pt reports he is feeling better today and he feels the left knee is bending a little better today as well  Objective: See treatment diary below  The patient performed the following as per flow sheet:   --Therapeutic Exercises for 55 minutes  --Cryotherapy & E-stim for 15 minutes  1   The patient reports pain scale as 3-5/10   --Description:  Sharp and achy   --Aggravated:  Unpredictable; constant    --Relieved:  Resting; time  2  Patient observation reveals decreased flexion ROM  3  Palpation for pain was positive over the ITB left > right   4  Reflex testing revealed the following:    --L3/L4:     2+/2+   --L4/L5:        --L5/S1:     2+/2+  5  Manual muscle testing revealed the following:    --Knee flexion:     4-/5 / 3+/5   --Knee extension:     4-/5 / 3+/5  6  Sensation was intact to light touch  7   Functional testing revealed the patient's LEFS to be 48/80  8  Lower Quarter Screen was intact for all myotomes tested  9   Active Range of Motion of the right knee is -6-110 degrees, left knee -7-92 degrees  10   Special testing revealed the following:    --Meniscal Implication:  -/-   --Plica Implication:  -/-   --Fat Pad Implication:  -/-   --Ligament Strain:  -/-  Patient's rehabilitation potential is good to achieve the following functional goals by attending physical therapy for 12 visits:  1  The patient will report their pain to be 1-2/10   2  The patient will be able to sit for at least 30 minutes without complaints of increased symptoms  3   The patient will be able to perform all ADL's independently and without fear of re-injury    4   The patient will be able to rise from a seated position one time every 30 minutes without difficulty or increased pain  5   The patient will be able to perform all work requirements without restrictions  6   The patient will be able to lift at least 25 pounds from the floor to waist level one time every 30 minutes without any difficulty or fear of re-injury  7   The patient will be able to sleep at least 6 hours undisturbed each night  8   The patient will be able to return to all reasonable leisure activities without restrictions  9   The patient's will have negative palpation for tenderness  10   The patient's manual muscle test will be within normal limits for all myotomes tested  11  The patient's active range of motion will be within normal limits for all ranges tested  12  The patient's LEFS will be 75-80/80  13  The patient will be independent with their home exercise program       Assessment: Pt progress is better this week  There was no hard end feel of the left knee today suggesting that the knee is responding better this week, and will probably not need a ZACK as predicted last week of the week before last       Plan: Continue per plan of care       Manual  5/11 5/14 5/18 5/24 5/25 5/29 5/30 6/1 6/4     PROM 5'  5  5  5  5  5  5  5  5     MFR 5 5'  5  5  5  5  5  5  5  5                                                                                   Exercise Diary                        QS 15x:10  15x  15x   15x  15x  15x  15x  15x  15x     Ball squeeze 15X:10  15x  15x  15x  15x  15x  15x  15x  15x     Hip ER w/ TB 15X:10  15x  15x  15x  15x  15x  15x  15x  15x     SAQ 2X15  30x  30x  30x  30x  30x  30x  30x  30x     Flex 6'  6  6  6  6  6  6  6  6     Calf raises 2x15  30x  30x  30x  30x  30x  30x  30x  30x     Leg press 2x15  30x  30x  30x  30x  30x  30x  30x  30x     LAQ 2x15  30x  30x  30x  30x  30x  30x  30x  30x      HSS   3x  3x  3x  3x  3x  3x  3x  3x      Bike      5'  5  6  6  6  6  6x    Ball squats              15x  15x  15x      TKE                  30x                                                                                                                                                                                                           Modalities                        CP/Stim 15'  15  15  15  15  15  15 Septimius Dibbles Septimius Dibbles

## 2018-06-04 NOTE — LETTER
2018    MD Niharika Shell  [address]    Patient: Mary Blankenship   YOB: 1955   Date of Visit: 2018     Encounter Diagnosis     ICD-10-CM    1  Acute pain of both knees M25 561     M25 562    2  Status post total bilateral knee replacement N19 913        Dear Dr Mundo Ramirez:    Please review the attached Plan of Care from [facility] recent visit  Please verify that you agree therapy should continue by signing the attached document and sending it back to our office  If you have any questions or concerns, please don't hesitate to call  Sincerely,    Rossi Bell PT      Referring Provider:      I certify that I have read the below Plan of Care and certify the need for these services furnished under this plan of treatment while under my care  MD Niharika Shell  [place] [address]  VIA Facsimile: [phone]          Daily Note     Today's date: 2018  Patient name: Mary Blankenship  : 1955  MRN: [id number]  Referring provider: Dc Booth MD  Dx:   Encounter Diagnosis     ICD-10-CM    1  Acute pain of both knees M25 561     M25 562    2  Status post total bilateral knee replacement Z96 653                 Visit 13    Subjective: Pt reports he is feeling better today and he feels the left knee is bending a little better today as well  Objective: See treatment diary below  The patient performed the following as per flow sheet:   --Therapeutic Exercises for 55 minutes  --Cryotherapy & E-stim for 15 minutes  1   The patient reports pain scale as 3-5/10   --Description:  Sharp and achy   --Aggravated:  Unpredictable; constant    --Relieved:  Resting; time  2  Patient observation reveals decreased flexion ROM  3  Palpation for pain was positive over the ITB left > right   4  Reflex testing revealed the following:    --L3/L4:     2+/2+   --L4/L5:        --L5/S1:     2+/2+  5  Manual muscle testing revealed the following:    --Knee flexion:     4-/5 / 3+/5   --Knee extension:     4-/5 / 3+/5  6  Sensation was intact to light touch  7   Functional testing revealed the patient's LEFS to be 48/80  8  Lower Quarter Screen was intact for all myotomes tested  9   Active Range of Motion of the right knee is -6-110 degrees, left knee -7-92 degrees  10   Special testing revealed the following:    --Meniscal Implication:  -/-   --Plica Implication:  -/-   --Fat Pad Implication:  -/-   --Ligament Strain:  -/-  Patient's rehabilitation potential is good to achieve the following functional goals by attending physical therapy for 12 visits:  1  The patient will report their pain to be 1-2/10   2  The patient will be able to sit for at least 30 minutes without complaints of increased symptoms  3   The patient will be able to perform all ADL's independently and without fear of re-injury  4   The patient will be able to rise from a seated position one time every 30 minutes without difficulty or increased pain  5   The patient will be able to perform all work requirements without restrictions  6   The patient will be able to lift at least 25 pounds from the floor to waist level one time every 30 minutes without any difficulty or fear of re-injury  7   The patient will be able to sleep at least 6 hours undisturbed each night  8   The patient will be able to return to all reasonable leisure activities without restrictions  9   The patient's will have negative palpation for tenderness  10   The patient's manual muscle test will be within normal limits for all myotomes tested  11  The patient's active range of motion will be within normal limits for all ranges tested  12  The patient's LEFS will be 75-80/80  13  The patient will be independent with their home exercise program       Assessment: Pt progress is better this week    There was no hard end feel of the left knee today suggesting that the knee is responding better this week, and will probably not need a ZACK as predicted last week of the week before last       Plan: Continue per plan of care       Manual  5/11 5/14 5/18 5/24 5/25 5/29 5/30 6/1 6/4     PROM 5'  5  5  5  5  5  5  5  5     MFR 5 5'  5  5  5  5  5  5  5  5                                                                                   Exercise Diary                        QS 15x:10  15x  15x   15x  15x  15x  15x  15x  15x     Ball squeeze 15X:10  15x  15x  15x  15x  15x  15x  15x  15x     Hip ER w/ TB 15X:10  15x  15x  15x  15x  15x  15x  15x  15x     SAQ 2X15  30x  30x  30x  30x  30x  30x  30x  30x     Flex 6'  6  6  6  6  6  6  6  6     Calf raises 2x15  30x  30x  30x  30x  30x  30x  30x  30x     Leg press 2x15  30x  30x  30x  30x  30x  30x  30x  30x     LAQ 2x15  30x  30x  30x  30x  30x  30x  30x  30x      HSS   3x  3x  3x  3x  3x  3x  3x  3x      Bike      5'  5  6  6  6  6  6x      Ball squats              15x  15x  15x      TKE                  30x                                                                                                                                                                                                           Modalities                        CP/Stim 15'  15  15  15  15  15  15 Dayday Waters

## 2018-06-06 ENCOUNTER — APPOINTMENT (OUTPATIENT)
Dept: PHYSICAL THERAPY | Facility: CLINIC | Age: 63
End: 2018-06-06
Payer: COMMERCIAL

## 2018-06-08 ENCOUNTER — OFFICE VISIT (OUTPATIENT)
Dept: PHYSICAL THERAPY | Facility: CLINIC | Age: 63
End: 2018-06-08
Payer: COMMERCIAL

## 2018-06-08 DIAGNOSIS — Z96.653 STATUS POST TOTAL BILATERAL KNEE REPLACEMENT: ICD-10-CM

## 2018-06-08 DIAGNOSIS — M25.562 ACUTE PAIN OF BOTH KNEES: Primary | ICD-10-CM

## 2018-06-08 DIAGNOSIS — M25.561 ACUTE PAIN OF BOTH KNEES: Primary | ICD-10-CM

## 2018-06-08 PROCEDURE — 97110 THERAPEUTIC EXERCISES: CPT | Performed by: PHYSICAL THERAPIST

## 2018-06-08 PROCEDURE — 97014 ELECTRIC STIMULATION THERAPY: CPT | Performed by: PHYSICAL THERAPIST

## 2018-06-08 NOTE — PROGRESS NOTES
Daily Note     Today's date: 2018  Patient name: Julieanne Mcardle  : 1955  MRN: [id number]  Referring provider: Kwan Crawley MD  Dx:   Encounter Diagnosis     ICD-10-CM    1  Acute pain of both knees M25 561     M25 562    2  Status post total bilateral knee replacement Z96 653                 Visit 14    Subjective: Pt reports he is scheduled to have ZACK next Tuesday  Objective: See treatment diary below  The patient performed the following as per flow sheet:   --Therapeutic Exercises for 50 minutes  --Cryotherapy & E-stim for 15 minutes  Assessment: Tolerated treatment well  Patient demonstrated fatigue post treatment, exhibited good technique with therapeutic exercises and would benefit from continued PT      Plan: Continue per plan of care       Manual     PROM 5'  5  5  5  5  5  5  5  5  5   MFR 5 5'  5  5  5  5  5  5  5  5  5                                                                                 Exercise Diary                        QS 15x:10  15x  15x   15x  15x  15x  15x  15x  15x  15x   Ball squeeze 15X:10  15x  15x  15x  15x  15x  15x  15x  15x  15x   Hip ER w/ TB 15X:10  15x  15x  15x  15x  15x  15x  15x  15x  15x   SAQ 2X15  30x  30x  30x  30x  30x  30x  30x  30x  30x   Flex 6'  6  6  6  6  6  6  6  6  6   Calf raises 2x15  30x  30x  30x  30x  30x  30x  30x  30x  30x   Leg press 2x15  30x  30x  30x  30x  30x  30x  30x  30x  30x   LAQ 2x15  30x  30x  30x  30x  30x  30x  30x  30x  30x    HSS   3x  3x  3x  3x  3x  3x  3x  3x  3x    Bike      5'  5  6  6  6  6  6x  6x    Ball squats              15x  15x  15x  15x    TKE                  30x  30x                                                                                                                                                                                                         Modalities                        CP/Stim 15'  15  15  15  15  15  15  15  42  92

## 2018-06-11 ENCOUNTER — OFFICE VISIT (OUTPATIENT)
Dept: PHYSICAL THERAPY | Facility: CLINIC | Age: 63
End: 2018-06-11
Payer: COMMERCIAL

## 2018-06-11 DIAGNOSIS — Z96.653 STATUS POST TOTAL BILATERAL KNEE REPLACEMENT: ICD-10-CM

## 2018-06-11 DIAGNOSIS — M25.561 ACUTE PAIN OF BOTH KNEES: Primary | ICD-10-CM

## 2018-06-11 DIAGNOSIS — M25.562 ACUTE PAIN OF BOTH KNEES: Primary | ICD-10-CM

## 2018-06-11 PROCEDURE — 97110 THERAPEUTIC EXERCISES: CPT | Performed by: PHYSICAL THERAPIST

## 2018-06-11 PROCEDURE — 97014 ELECTRIC STIMULATION THERAPY: CPT | Performed by: PHYSICAL THERAPIST

## 2018-06-11 NOTE — PROGRESS NOTES
Daily Note     Today's date: 2018  Patient name: Alisson Cole  : 1955  MRN: [id number]  Referring provider: Peng Queen MD  Dx:   Encounter Diagnosis     ICD-10-CM    1  Acute pain of both knees M25 561     M25 562    2  Status post total bilateral knee replacement Z96 653                 Visit 15    Subjective: Pt reports he is feeling very sore today and he reports he had a bad weekend with a lot of pain  Objective: See treatment diary below  The patient performed the following as per flow sheet:   --Therapeutic Exercises for 55 minutes  --Cryotherapy & E-stim for 15 minutes  Assessment: Pt progress is good  He is scheduled to have the ZACK tomorrow  I think this will help with his ROM, but not motivate him to be more active with his recovery  The reason he is having this is because he did not do his exercises at home and sat in the recliner all day  I am fearful this will happen again after ZACK  Plan: Continue per plan of care       Manual     PROM 5'  5  5  5  5  5  5  5  5  5   MFR 5 5'  5  5  5  5  5  5  5  5  5                                                                                 Exercise Diary                        QS 15x:10  15x  15x   15x  15x  15x  15x  15x  15x  15x   Ball squeeze 15X:10  15x  15x  15x  15x  15x  15x  15x  15x  15x   Hip ER w/ TB 15X:10  15x  15x  15x  15x  15x  15x  15x  15x  15x   SAQ 2X15  30x  30x  30x  30x  30x  30x  30x  30x  30x   Flex 6'  6  6  6  6  6  6  6  6  6   Calf raises 2x15  30x  30x  30x  30x  30x  30x  30x  30x  30x   Leg press 2x15  30x  30x  30x  30x  30x  30x  30x  30x  30x   LAQ 2x15  30x  30x  30x  30x  30x  30x  30x  30x  30x    HSS  3x 3x  3x  3x  3x  3x  3x  3x  3x  3x    Bike  6x    5'  5  6  6  6  6  6x  6x    Ball squats  15x            15x  15x  15x  15x    TKE  30x                30x  30x                                                                                                                                                                                                         Modalities                        CP/Stim 15'  15  15  15  15  15  15  75  19 Tita Perez

## 2018-06-13 ENCOUNTER — OFFICE VISIT (OUTPATIENT)
Dept: PHYSICAL THERAPY | Facility: CLINIC | Age: 63
End: 2018-06-13
Payer: COMMERCIAL

## 2018-06-13 DIAGNOSIS — Z96.653 STATUS POST TOTAL BILATERAL KNEE REPLACEMENT: ICD-10-CM

## 2018-06-13 DIAGNOSIS — M25.561 ACUTE PAIN OF BOTH KNEES: Primary | ICD-10-CM

## 2018-06-13 DIAGNOSIS — M25.562 ACUTE PAIN OF BOTH KNEES: Primary | ICD-10-CM

## 2018-06-13 PROCEDURE — 97110 THERAPEUTIC EXERCISES: CPT | Performed by: PHYSICAL THERAPIST

## 2018-06-13 PROCEDURE — 97014 ELECTRIC STIMULATION THERAPY: CPT | Performed by: PHYSICAL THERAPIST

## 2018-06-13 NOTE — PROGRESS NOTES
Daily Note     Today's date: 2018  Patient name: Tab Grace  : 1955  MRN: [id number]  Referring provider: Ida Alexis MD  Dx: No diagnosis found  Visit 16    Subjective: Pt reports he is in a ton of pain and he feels the left knee is no better than it was before the ZACK  Objective: See treatment diary below  The patient performed the following as per flow sheet:   --Therapeutic Exercises for 50 minutes  --Cryotherapy & E-stim for 15 minutes  1   The patient reports pain scale as 4-7/10              --Description:  Sharp and achy              --Aggravated:  Unpredictable; constant               --Relieved:  Resting; time  2  Patient observation reveals decreased flexion ROM  3  Palpation for pain was positive over the ITB left > right   4  Reflex testing revealed the following:               --L3/L4:     2+/2+              --L4/L5:                   --L5/S1:     2+/2+  5  Manual muscle testing revealed the following:               --Knee flexion:     3+/5 / 3+/5              --Knee extension:     3+/55 / 3+/5  6  Sensation was intact to light touch  7   Functional testing revealed the patient's LEFS to be 42/80  8  Lower Quarter Screen was intact for all myotomes tested  9   Active Range of Motion of the right knee is -8-108 degrees, left knee -12-72 degrees  10   Special testing revealed the following:               --Meniscal Implication:  -/-              --Plica Implication:  -/-              --Fat Pad Implication:  -/-              --Ligament Strain:  -/-  Patient's rehabilitation potential is fair to achieve the following functional goals by attending physical therapy for 12 visits:  1  The patient will report their pain to be 1-2/10   2  The patient will be able to sit for at least 30 minutes without complaints of increased symptoms  3   The patient will be able to perform all ADL's independently and without fear of re-injury    4   The patient will be able to rise from a seated position one time every 30 minutes without difficulty or increased pain  5   The patient will be able to perform all work requirements without restrictions  6   The patient will be able to lift at least 25 pounds from the floor to waist level one time every 30 minutes without any difficulty or fear of re-injury  7   The patient will be able to sleep at least 6 hours undisturbed each night  8   The patient will be able to return to all reasonable leisure activities without restrictions  9   The patient's will have negative palpation for tenderness  10   The patient's manual muscle test will be within normal limits for all myotomes tested  11  The patient's active range of motion will be within normal limits for all ranges tested  12  The patient's LEFS will be 75-80/80  13  The patient will be independent with their home exercise program     Assessment: Pt progress is not good  He feels about the same after the ZACK, and this is what I figured would happen after this procedure, and I felt it was not a good idea to go through with this  Plan: Continue per plan of care       Manual  6/11 6/13 5/18 5/24 5/25 5/29 5/30 6/1 6/4 6/8   PROM 5'  5  5  5  5  5  5  5  5  5   MFR 5 5'  5  5  5  5  5  5  5  5  5                                                                                 Exercise Diary                        QS 15x:10  15x  15x   15x  15x  15x  15x  15x  15x  15x   Ball squeeze 15X:10  15x  15x  15x  15x  15x  15x  15x  15x  15x   Hip ER w/ TB 15X:10  15x  15x  15x  15x  15x  15x  15x  15x  15x   SAQ 2X15  30x  30x  30x  30x  30x  30x  30x  30x  30x   Flex 6'  6  6  6  6  6  6  6  6  6   Calf raises 2x15   30x  30x  30x  30x  30x  30x  30x  30x   Leg press 2x15  30x  30x  30x  30x  30x  30x  30x  30x  30x   LAQ 2x15  30x  30x  30x  30x  30x  30x  30x  30x  30x    HSS  3x 3x  3x  3x  3x  3x  3x  3x  3x  3x    Bike  6x    5'  5  6  6  6  6  6x  6x    Ball squats  15x            15x  15x  15x  15x    TKE  30x                30x  30x                                                                                                                                                                                                         Modalities                        CP/Stim 15'  15  15  15  15  15  15  19  18  12

## 2018-06-14 ENCOUNTER — OFFICE VISIT (OUTPATIENT)
Dept: PHYSICAL THERAPY | Facility: CLINIC | Age: 63
End: 2018-06-14
Payer: COMMERCIAL

## 2018-06-14 DIAGNOSIS — M25.562 ACUTE PAIN OF BOTH KNEES: Primary | ICD-10-CM

## 2018-06-14 DIAGNOSIS — M25.561 ACUTE PAIN OF BOTH KNEES: Primary | ICD-10-CM

## 2018-06-14 DIAGNOSIS — Z96.653 STATUS POST TOTAL BILATERAL KNEE REPLACEMENT: ICD-10-CM

## 2018-06-14 PROCEDURE — 97110 THERAPEUTIC EXERCISES: CPT | Performed by: PHYSICAL THERAPIST

## 2018-06-14 PROCEDURE — 97014 ELECTRIC STIMULATION THERAPY: CPT | Performed by: PHYSICAL THERAPIST

## 2018-06-14 NOTE — PROGRESS NOTES
Daily Note     Today's date: 2018  Patient name: Kranthi Quintana  : 1955  MRN: [id number]  Referring provider: Lilliam Lopez MD  Dx:   Encounter Diagnosis     ICD-10-CM    1  Acute pain of both knees M25 561     M25 562    2  Status post total bilateral knee replacement Z96 653                 Visit 17      Subjective: Pt reports he feels pretty sore today and he is frustrated because the left knee feels worse than before the ZACK  Objective: See treatment diary below  The patient performed the following as per flow sheet:   --Therapeutic Exercises for 50 minutes  --Cryotherapy & E-stim for 15 minutes  Assessment: Pt progress is good  He is obsessed with how the knee is doing, and yet, he will not do what he is supposed to be doing outside of PT which includes the exercises he was given here and staying active  Plan: Continue per plan of care       Manual     PROM 5'  5  5  5  5  5  5  5  5  5   MFR 5 5'  5  5  5  5  5  5  5  5  5                                                                                 Exercise Diary                        QS 15x:10  15x  15x   15x  15x  15x  15x  15x  15x  15x   Ball squeeze 15X:10  15x  15x  15x  15x  15x  15x  15x  15x  15x   Hip ER w/ TB 15X:10  15x  15x  15x  15x  15x  15x  15x  15x  15x   SAQ 2X15  30x  30x  30x  30x  30x  30x  30x  30x  30x   Flex 6'  6  6  6  6  6  6  6  6  6   Calf raises      30x  30x  30x  30x  30x  30x  30x   Leg press 2x15  30x  30x  30x  30x  30x  30x  30x  30x  30x   LAQ 2x15  30x  30x  30x  30x  30x  30x  30x  30x  30x    HSS  3x 3x  3x  3x  3x  3x  3x  3x  3x  3x    Bike  6x  6x  5'  5  6  6  6  6  6x  6x    Ball squats            15x  15x  15x  15x    TKE   30x            30x  30x                                                                                                                                                                                                         Modalities                        CP/Stim 15'  15  15  15  15  15  15  15  15 Rashida Brumfield

## 2018-06-15 ENCOUNTER — OFFICE VISIT (OUTPATIENT)
Dept: PHYSICAL THERAPY | Facility: CLINIC | Age: 63
End: 2018-06-15
Payer: COMMERCIAL

## 2018-06-15 DIAGNOSIS — M25.562 ACUTE PAIN OF BOTH KNEES: Primary | ICD-10-CM

## 2018-06-15 DIAGNOSIS — Z96.653 STATUS POST TOTAL BILATERAL KNEE REPLACEMENT: ICD-10-CM

## 2018-06-15 DIAGNOSIS — M25.561 ACUTE PAIN OF BOTH KNEES: Primary | ICD-10-CM

## 2018-06-15 PROCEDURE — 97110 THERAPEUTIC EXERCISES: CPT | Performed by: PHYSICAL THERAPIST

## 2018-06-15 PROCEDURE — 97014 ELECTRIC STIMULATION THERAPY: CPT | Performed by: PHYSICAL THERAPIST

## 2018-06-15 NOTE — PROGRESS NOTES
Daily Note     Today's date: 6/15/2018  Patient name: Karen Flynn  : 1955  MRN: [id number]  Referring provider: Andrew Rodriguez MD  Dx:   Encounter Diagnosis     ICD-10-CM    1  Acute pain of both knees M25 561     M25 562    2  Status post total bilateral knee replacement Z96 653                 Visit 18    Subjective: Pt he is not doing well and he was advised to call the surgeon and report his concerns  Objective: See treatment diary below  The patient performed the following as per flow sheet:   --Therapeutic Exercises for 50 minutes  --Cryotherapy & E-stim for 15 minutes  Assessment: Tolerated treatment poor  Patient demonstrated fatigue post treatment      Plan: Continue per plan of care       Manual  6/11  6/13 6/14  6/15  5/25  5/29  5/30  6/1  6/4  6/8   PROM 5'  5  5  5  5  5  5  5  5  5   MFR 5 5'  5  5  5  5  5  5  5  5  5                                                                                 Exercise Diary                        QS 15x:10  15x  15x   15x  15x  15x  15x  15x  15x  15x   Ball squeeze 15X:10  15x  15x  15x  15x  15x  15x  15x  15x  15x   Hip ER w/ TB 15X:10  15x  15x  15x  15x  15x  15x  15x  15x  15x   SAQ 2X15  30x  30x  30x  30x  30x  30x  30x  30x  30x   Flex 6'  6  6  6  6  6  6  6  6  6   Calf raises        30x  30x  30x  30x  30x  30x  30x   Leg press 2x15  30x  30x  30x  30x  30x  30x  30x  30x  30x   LAQ 2x15  30x  30x  30x  30x  30x  30x  30x  30x  30x    HSS  3x 3x  3x  3x  3x  3x  3x  3x  3x  3x    Bike  6x  6x  5'  5  6  6  6  6  6x  6x    Ball squats              15x  15x  15x  15x    TKE     30x            30x  30x                                                                                                                                                                                                         Modalities                        CP/Stim 15'  15  15  15  15  15  15 Kaya Jean

## 2018-06-18 ENCOUNTER — OFFICE VISIT (OUTPATIENT)
Dept: PHYSICAL THERAPY | Facility: CLINIC | Age: 63
End: 2018-06-18
Payer: COMMERCIAL

## 2018-06-18 DIAGNOSIS — M25.561 ACUTE PAIN OF BOTH KNEES: Primary | ICD-10-CM

## 2018-06-18 DIAGNOSIS — Z96.653 STATUS POST TOTAL BILATERAL KNEE REPLACEMENT: ICD-10-CM

## 2018-06-18 DIAGNOSIS — M25.562 ACUTE PAIN OF BOTH KNEES: Primary | ICD-10-CM

## 2018-06-18 PROCEDURE — 97110 THERAPEUTIC EXERCISES: CPT | Performed by: PHYSICAL THERAPIST

## 2018-06-18 PROCEDURE — 97014 ELECTRIC STIMULATION THERAPY: CPT | Performed by: PHYSICAL THERAPIST

## 2018-06-18 NOTE — PROGRESS NOTES
Daily Note     Today's date: 2018  Patient name: Rickey Milian  : 1955  MRN: [id number]  Referring provider: Jc Gamez MD  Dx:   Encounter Diagnosis     ICD-10-CM    1  Acute pain of both knees M25 561     M25 562    2  Status post total bilateral knee replacement Z96 653                 Visit 19    Subjective: Pt reports he feels things are not going well and he is quite discouraged  Objective: See treatment diary below  The patient performed the following as per flow sheet:   --Therapeutic Exercises for 50 minutes  --Cryotherapy & E-stim for 15 minutes  Assessment: Pt progress is better today  I feel he is not doing much of anything at home  I feel the decision to ZACK the knees was not thoroughly thought through, and patient selection was poor for this procedure  Plan: Continue per plan of care  Addendum 18:  Pt D/C'd from PT by therapist due to inability to participate in PT post-manipulation      Manual  6/11  6/13 6/14  6/15  6/18  5/29  5/30  6/1  6/4  6/8   PROM 5'  5  5  5  5  5  5  5  5  5   MFR 5 5'  5  5  5  5  5  5  5  5  5                                                                                 Exercise Diary                        QS 15x:10  15x  15x   15x  15x  15x  15x  15x  15x  15x   Ball squeeze 15X:10  15x  15x  15x  15x  15x  15x  15x  15x  15x   Hip ER w/ TB 15X:10  15x  15x  15x  15x  15x  15x  15x  15x  15x   SAQ 2X15  30x  30x  30x  30x  30x  30x  30x  30x  30x   Flex 6'  6  6  6  6  6  6  6  6  6   Calf raises        30x  30x  30x  30x  30x  30x  30x   Leg press 2x15  30x  30x  30x  30x  30x  30x  30x  30x  30x   LAQ 2x15  30x  30x  30x  30x  30x  30x  30x  30x  30x    HSS  3x 3x  3x  3x  3x  3x  3x  3x  3x  3x    Bike  6x  6x  5'  5  6  6  6  6  6x  6x    Ball squats              15x  15x  15x  15x    TKE     30x  30x 30x         30x  30x                                                                                                                                                                                                       Modalities                        CP/Stim 15'  15  15  15  15  15  15  16  90 Fauna Snooks

## 2018-06-19 ENCOUNTER — APPOINTMENT (OUTPATIENT)
Dept: PHYSICAL THERAPY | Facility: CLINIC | Age: 63
End: 2018-06-19
Payer: COMMERCIAL

## 2018-06-20 ENCOUNTER — APPOINTMENT (OUTPATIENT)
Dept: PHYSICAL THERAPY | Facility: CLINIC | Age: 63
End: 2018-06-20
Payer: COMMERCIAL

## 2018-06-21 ENCOUNTER — APPOINTMENT (OUTPATIENT)
Dept: PHYSICAL THERAPY | Facility: CLINIC | Age: 63
End: 2018-06-21
Payer: COMMERCIAL

## 2018-06-22 ENCOUNTER — APPOINTMENT (OUTPATIENT)
Dept: PHYSICAL THERAPY | Facility: CLINIC | Age: 63
End: 2018-06-22
Payer: COMMERCIAL

## 2018-06-25 ENCOUNTER — APPOINTMENT (OUTPATIENT)
Dept: PHYSICAL THERAPY | Facility: CLINIC | Age: 63
End: 2018-06-25
Payer: COMMERCIAL

## 2018-06-26 ENCOUNTER — APPOINTMENT (OUTPATIENT)
Dept: PHYSICAL THERAPY | Facility: CLINIC | Age: 63
End: 2018-06-26
Payer: COMMERCIAL

## 2018-06-27 ENCOUNTER — APPOINTMENT (OUTPATIENT)
Dept: PHYSICAL THERAPY | Facility: CLINIC | Age: 63
End: 2018-06-27
Payer: COMMERCIAL

## 2018-06-28 ENCOUNTER — APPOINTMENT (OUTPATIENT)
Dept: PHYSICAL THERAPY | Facility: CLINIC | Age: 63
End: 2018-06-28
Payer: COMMERCIAL

## 2018-06-29 ENCOUNTER — APPOINTMENT (OUTPATIENT)
Dept: PHYSICAL THERAPY | Facility: CLINIC | Age: 63
End: 2018-06-29
Payer: COMMERCIAL

## 2020-10-18 ENCOUNTER — OFFICE VISIT (OUTPATIENT)
Dept: URGENT CARE | Facility: MEDICAL CENTER | Age: 65
End: 2020-10-18
Payer: COMMERCIAL

## 2020-10-18 VITALS
HEART RATE: 76 BPM | WEIGHT: 186 LBS | TEMPERATURE: 98.1 F | BODY MASS INDEX: 28.19 KG/M2 | OXYGEN SATURATION: 97 % | RESPIRATION RATE: 18 BRPM | DIASTOLIC BLOOD PRESSURE: 79 MMHG | HEIGHT: 68 IN | SYSTOLIC BLOOD PRESSURE: 149 MMHG

## 2020-10-18 DIAGNOSIS — J01.10 ACUTE NON-RECURRENT FRONTAL SINUSITIS: Primary | ICD-10-CM

## 2020-10-18 PROCEDURE — 99213 OFFICE O/P EST LOW 20 MIN: CPT | Performed by: NURSE PRACTITIONER

## 2020-10-18 RX ORDER — EMPAGLIFLOZIN 10 MG/1
10 TABLET, FILM COATED ORAL EVERY MORNING
COMMUNITY
Start: 2020-09-15

## 2020-10-18 RX ORDER — LEVOFLOXACIN 500 MG/1
500 TABLET, FILM COATED ORAL EVERY 24 HOURS
Qty: 7 TABLET | Refills: 0 | Status: SHIPPED | OUTPATIENT
Start: 2020-10-18 | End: 2020-10-25

## 2020-10-18 RX ORDER — PREDNISONE 20 MG/1
20 TABLET ORAL 2 TIMES DAILY WITH MEALS
Qty: 10 TABLET | Refills: 0 | Status: SHIPPED | OUTPATIENT
Start: 2020-10-18 | End: 2020-10-23

## 2020-10-18 RX ORDER — GABAPENTIN 300 MG/1
CAPSULE ORAL
COMMUNITY
Start: 2020-09-04

## 2020-10-18 RX ORDER — IBUPROFEN 800 MG/1
TABLET ORAL
COMMUNITY
Start: 2020-09-04

## 2020-11-09 ENCOUNTER — OFFICE VISIT (OUTPATIENT)
Dept: URGENT CARE | Facility: MEDICAL CENTER | Age: 65
End: 2020-11-09
Payer: COMMERCIAL

## 2020-11-09 VITALS
DIASTOLIC BLOOD PRESSURE: 80 MMHG | HEIGHT: 68 IN | HEART RATE: 78 BPM | SYSTOLIC BLOOD PRESSURE: 132 MMHG | OXYGEN SATURATION: 98 % | WEIGHT: 186 LBS | TEMPERATURE: 97.3 F | RESPIRATION RATE: 16 BRPM | BODY MASS INDEX: 28.19 KG/M2

## 2020-11-09 DIAGNOSIS — J06.9 ACUTE URI: Primary | ICD-10-CM

## 2020-11-09 PROCEDURE — 99213 OFFICE O/P EST LOW 20 MIN: CPT | Performed by: FAMILY MEDICINE

## 2020-11-09 RX ORDER — BENZONATATE 100 MG/1
100 CAPSULE ORAL 3 TIMES DAILY PRN
Qty: 20 CAPSULE | Refills: 0 | Status: SHIPPED | OUTPATIENT
Start: 2020-11-09

## 2020-11-09 RX ORDER — FLUTICASONE PROPIONATE 50 MCG
2 SPRAY, SUSPENSION (ML) NASAL DAILY
Qty: 16 G | Refills: 0 | Status: SHIPPED | OUTPATIENT
Start: 2020-11-09

## 2020-11-09 RX ORDER — SAXAGLIPTIN AND METFORMIN HYDROCHLORIDE 2.5; 1 MG/1; MG/1
1 TABLET, FILM COATED, EXTENDED RELEASE ORAL 2 TIMES DAILY
COMMUNITY
Start: 2020-11-02

## 2020-12-01 DIAGNOSIS — J06.9 ACUTE URI: ICD-10-CM

## 2020-12-01 RX ORDER — FLUTICASONE PROPIONATE 50 MCG
SPRAY, SUSPENSION (ML) NASAL
Qty: 16 ML | OUTPATIENT
Start: 2020-12-01

## 2022-04-25 ENCOUNTER — APPOINTMENT (OUTPATIENT)
Dept: RADIOLOGY | Facility: CLINIC | Age: 67
End: 2022-04-25
Payer: COMMERCIAL

## 2022-04-25 DIAGNOSIS — R06.02 SHORTNESS OF BREATH: ICD-10-CM

## 2022-04-25 DIAGNOSIS — R05.9 COUGH: ICD-10-CM

## 2022-04-25 PROCEDURE — 71046 X-RAY EXAM CHEST 2 VIEWS: CPT

## 2022-09-21 ENCOUNTER — EVALUATION (OUTPATIENT)
Dept: PHYSICAL THERAPY | Facility: CLINIC | Age: 67
End: 2022-09-21
Payer: COMMERCIAL

## 2022-09-21 DIAGNOSIS — M54.12 CERVICAL RADICULAR PAIN: Primary | ICD-10-CM

## 2022-09-21 DIAGNOSIS — M43.6 NECK STIFFNESS: ICD-10-CM

## 2022-09-21 PROCEDURE — 97161 PT EVAL LOW COMPLEX 20 MIN: CPT | Performed by: PHYSICAL THERAPIST

## 2022-09-21 PROCEDURE — 97110 THERAPEUTIC EXERCISES: CPT | Performed by: PHYSICAL THERAPIST

## 2022-09-21 NOTE — PROGRESS NOTES
PT Evaluation     Today's date: 2022  Patient name: Kinga Echols  : 1955  MRN: [id number]  Referring provider: Rosemary De Jesus DO  Dx:   Encounter Diagnosis     ICD-10-CM    1  Cervical radicular pain  M54 12    2  Neck stiffness  M43 6        Start Time: 1610  Stop Time: 1700  Total time in clinic (min): 50 minutes    Assessment  Assessment details: Pt is a 73year old male with chronic neck pain, hx of cervical fusion  Pain range 3-7/10  Cervical ROM min to mod limited all planes  Reports periodic radiating pain lateral upper arm to wrist, not present today  Pt exhibits poor posture with forward head and protracted shoulders  R shoulder abd weak compared to L  Increase in pain with looking up and prolonged sitting, avoids heavy lifting  Neck Disability Index 24% disability  Pt would benefit from a course of PT in order to control pain, educate in posture and body mechanics and improve flexibility and function in c-spine  Impairments: abnormal or restricted ROM, activity intolerance, impaired physical strength, lacks appropriate home exercise program and pain with function  Functional limitations: doesn't lift weight, pain with sitting and looking up  Symptom irritability: lowUnderstanding of Dx/Px/POC: fair   Prognosis: good    Goals  STG- 4 weeks 10/19/22  1  I HEP  2  Decrease pain range to 0-4/10  3  Increase AROM c-spine to mod limit all planes  4  Centralize R UE sx's    LTG- 8 weeks 22  1  Decrease pain range to 0-2/10  2  Increase AROM to min limit all planes  3  Improve NDI to under 12% disability  4   Imporve patient's awareness of posture and body mechanics in order to prevent re-occurrence of sx's      Plan  Patient would benefit from: PT eval and skilled physical therapy  Referral necessary: No  Planned modality interventions: thermotherapy: hydrocollator packs and cryotherapy  Planned therapy interventions: manual therapy, massage, neuromuscular re-education, therapeutic activities, therapeutic exercise, postural training, patient education and home exercise program  Frequency: 2x week  Duration in visits: 16  Duration in weeks: 8  Plan of Care beginning date: 2022  Plan of Care expiration date: 2022  Treatment plan discussed with: patient        Subjective Evaluation    History of Present Illness  Date of onset: 7/15/2022  Mechanism of injury: Pt reports long hx of neck pain with hx of discectomy/cervical fusion(level unknown)  Pain increasing over past few months  Pt had 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 (level unknown)  Pt referred for outpatient PT            Recurrent probem    Quality of life: good    Pain  Current pain ratin  At best pain ratin  At worst pain ratin  Quality: dull ache, pressure and tight  Aggravating factors: sitting and overhead activity    Social Support  Steps to enter house: yes  2  Stairs in house: yes   14  Lives in: multiple-level home  Lives with: spouse    Employment status: working ([employer] work at [employer])  Hand dominance: right      Diagnostic Tests  X-ray: abnormal  Treatments  Previous treatment: physical therapy  Current treatment: physical therapy  Patient Goals  Patient goals for therapy: increased motion and decreased pain  Patient goal: decrease pain        Objective     Postural Observations  Seated posture: poor  Standing posture: poor        Tenderness     Additional Tenderness Details  Pain B lower cervical paraspinals R > L, pain B upper trap R > L    Neurological Testing     Sensation   Cervical/Thoracic   Left   Intact: light touch    Right   Intact: light touch    Active Range of Motion   Cervical/Thoracic Spine       Cervical    Subcranial retraction:   Restriction level: minimal  Flexion:  Restriction level: moderate  Extension:  with pain Restriction level: moderate  Left lateral flexion:  with pain Restriction level: moderate  Right lateral flexion:  with pain Restriction level maximal  Left rotation:  with pain Restriction level: moderate  Right rotation:  with pain Restriction level: moderate    Joint Play     Comments: Not assessed, patient has cervical fusion    Strength/Myotome Testing     Left Shoulder     Planes of Motion   Flexion: 5   Extension: 5   Abduction: 5     Right Shoulder     Planes of Motion   Flexion: 5   Extension: 5   Abduction: 4     Left Elbow   Flexion: 5    Right Elbow   Flexion: 5    Left Wrist/Hand   Wrist extension: 5  Wrist flexion: 5  Thumb extension: 5    Right Wrist/Hand   Wrist extension: 5  Wrist flexion: 5  Thumb extension: 5    Additional Strength Details  intrinsics strong and symetrical; 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    Tests   Cervical   Negative vertical compression and cervical distraction  Left   Negative Spurling's Test B  Right   Negative Spurling's Test B  Left Shoulder   Negative ULTT1  Right Shoulder   Negative ULTT1  Lumbar   Negative vertical compression       General Comments:    Upper quarter screen   Elbow: unremarkable    Shoulder Comments   R shoulder abd, ER 4/5    Wrist/Hand Comments  B DuPuytrens present B hands    Cervical/Thoracic Comments  9/21/2- NDI 24% disability      Flowsheet Rows    Flowsheet Row Most Recent Value   PT/OT G-Codes    Current Score 53   Projected Score 61   FOTO information reviewed Yes   Assessment Type Evaluation             Precautions: DM, hx neck surgery      Manuals 9/21            Josh Edelson traction/STM/cervical mobs                                                    Neuro Re-Ed             Nerve glides                                                                                           Ther Ex             AROM c-spine             Axial ext             scap elevate/retract                                                                              Ther Activity             UBE                                                                 Modalities

## 2022-09-21 NOTE — LETTER
2022    Colton Burt DO  [address] [place] [address]    Patient: Ivonne Craig   YOB: 1955   Date of Visit: 2022     Encounter Diagnosis     ICD-10-CM    1  Cervical radicular pain  M54 12    2  Neck stiffness  M43 6        Dear Dr Ale Perez Recipients: Thank you for your recent referral of Ivonne Craig  Please review the attached evaluation summary from Mukund's recent visit  Please verify that you agree with the plan of care by signing the attached order  If you have any questions or concerns, please do not hesitate to call  I sincerely appreciate the opportunity to share in the care of one of your patients and hope to have another opportunity to work with you in the near future  Sincerely,    Barrera Bradley, PT      Referring Provider:      I certify that I have read the below Plan of Care and certify the need for these services furnished under this plan of treatment while under my care  Colton Burt DO  [address]  [facility] [address] [place] [address]  Via Fax: [phone]          PT Evaluation     Today's date: 2022  Patient name: Ivonne Craig  : 1955  MRN: [id number]  Referring provider: Mary Verdugo DO  Dx:   Encounter Diagnosis     ICD-10-CM    1  Cervical radicular pain  M54 12    2  Neck stiffness  M43 6        Start Time: 1610  Stop Time: 1700  Total time in clinic (min): 50 minutes    Assessment  Assessment details: Pt is a 73year old male with chronic neck pain, hx of cervical fusion  Pain range 3-7/10  Cervical ROM min to mod limited all planes  Reports periodic radiating pain lateral upper arm to wrist, not present today  Pt exhibits poor posture with forward head and protracted shoulders  R shoulder abd weak compared to L  Increase in pain with looking up and prolonged sitting, avoids heavy lifting  Neck Disability Index 24% disability   Pt would benefit from a course of PT in order to control pain, educate in posture and body mechanics and improve flexibility and function in c-spine  Impairments: abnormal or restricted ROM, activity intolerance, impaired physical strength, lacks appropriate home exercise program and pain with function  Functional limitations: doesn't lift weight, pain with sitting and looking up  Symptom irritability: lowUnderstanding of Dx/Px/POC: fair   Prognosis: good    Goals  STG- 4 weeks 10/19/22  1  I HEP  2  Decrease pain range to 0-4/10  3  Increase AROM c-spine to mod limit all planes  4  Centralize R UE sx's    LTG- 8 weeks 22  1  Decrease pain range to 0-2/10  2  Increase AROM to min limit all planes  3  Improve NDI to under 12% disability  4  Imporve patient's awareness of posture and body mechanics in order to prevent re-occurrence of sx's      Plan  Patient would benefit from: PT eval and skilled physical therapy  Referral necessary: No  Planned modality interventions: thermotherapy: hydrocollator packs and cryotherapy  Planned therapy interventions: manual therapy, massage, neuromuscular re-education, therapeutic activities, therapeutic exercise, postural training, patient education and home exercise program  Frequency: 2x week  Duration in visits: 16  Duration in weeks: 8  Plan of Care beginning date: 2022  Plan of Care expiration date: 2022  Treatment plan discussed with: patient        Subjective Evaluation    History of Present Illness  Date of onset: 7/15/2022  Mechanism of injury: Pt reports long hx of neck pain with hx of discectomy/cervical fusion(level unknown)  Pain increasing over past few months  Pt had 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 (level unknown)  Pt referred for outpatient PT            Recurrent probem    Quality of life: good    Pain  Current pain ratin  At best pain ratin  At worst pain ratin  Quality: dull ache, pressure and tight  Aggravating factors: sitting and overhead activity    Social Support  Steps to enter house: yes  2  Stairs in house: yes   14  Lives in: multiple-level home  Lives with: spouse    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[employer])  Hand dominance: right      Diagnostic Tests  X-ray: abnormal  Treatments  Previous treatment: physical therapy  Current treatment: physical therapy  Patient Goals  Patient goals for therapy: increased motion and decreased pain  Patient goal: decrease pain        Objective     Postural Observations  Seated posture: poor  Standing posture: poor        Tenderness     Additional Tenderness Details  Pain B lower cervical paraspinals R > L, pain B upper trap R > L    Neurological Testing     Sensation   Cervical/Thoracic   Left   Intact: light touch    Right   Intact: light touch    Active Range of Motion   Cervical/Thoracic Spine       Cervical    Subcranial retraction:   Restriction level: minimal  Flexion:  Restriction level: moderate  Extension:  with pain Restriction level: moderate  Left lateral flexion:  with pain Restriction level: moderate  Right lateral flexion:  with pain Restriction level maximal  Left rotation:  with pain Restriction level: moderate  Right rotation:  with pain Restriction level: moderate    Joint Play     Comments: Not assessed, patient has cervical fusion    Strength/Myotome Testing     Left Shoulder     Planes of Motion   Flexion: 5   Extension: 5   Abduction: 5     Right Shoulder     Planes of Motion   Flexion: 5   Extension: 5   Abduction: 4     Left Elbow   Flexion: 5    Right Elbow   Flexion: 5    Left Wrist/Hand   Wrist extension: 5  Wrist flexion: 5  Thumb extension: 5    Right Wrist/Hand   Wrist extension: 5  Wrist flexion: 5  Thumb extension: 5    Additional Strength Details  intrinsics strong and symetrical; 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    Tests   Cervical   Negative vertical compression and cervical distraction  Left   Negative Spurling's Test B  Right   Negative Spurling's Test B  Left Shoulder   Negative ULTT1  Right Shoulder   Negative ULTT1  Lumbar   Negative vertical compression       General Comments:    Upper quarter screen   Elbow: unremarkable    Shoulder Comments   R shoulder abd, ER 4/5    Wrist/Hand Comments  B DuPuytrens present B hands    Cervical/Thoracic Comments  9/21/2- NDI 24% disability      Flowsheet Rows    Flowsheet Row Most Recent Value   PT/OT G-Codes    Current Score 53   Projected Score 61   FOTO information reviewed Yes   Assessment Type Evaluation             Precautions: DM, hx neck surgery      Manuals 9/21            [facility] traction/STM/cervical mobs                                                    Neuro Re-Ed             Nerve glides                                                                                           Ther Ex             AROM c-spine             Axial ext             scap elevate/retract                                                                              Ther Activity             UBE                                                                 Modalities

## 2022-09-26 ENCOUNTER — OFFICE VISIT (OUTPATIENT)
Dept: PHYSICAL THERAPY | Facility: CLINIC | Age: 67
End: 2022-09-26
Payer: COMMERCIAL

## 2022-09-26 DIAGNOSIS — M43.6 NECK STIFFNESS: Primary | ICD-10-CM

## 2022-09-26 DIAGNOSIS — M54.12 CERVICAL RADICULAR PAIN: ICD-10-CM

## 2022-09-26 PROCEDURE — 97110 THERAPEUTIC EXERCISES: CPT | Performed by: PHYSICAL THERAPIST

## 2022-09-26 PROCEDURE — 97530 THERAPEUTIC ACTIVITIES: CPT | Performed by: PHYSICAL THERAPIST

## 2022-09-26 PROCEDURE — 97140 MANUAL THERAPY 1/> REGIONS: CPT | Performed by: PHYSICAL THERAPIST

## 2022-09-26 NOTE — PROGRESS NOTES
Daily Note     Today's date: 2022  Patient name: Tyshawn Moreno  : 1955  MRN: [id number]  Referring provider: Amber Angulo DO  Dx:   Encounter Diagnosis     ICD-10-CM    1  Neck stiffness  M43 6    2  Cervical radicular pain  M54 12        Start Time: 1550  Stop Time: 6323  Total time in clinic (min): 45 minutes    Subjective: Pain range 5-7/10  More pain after driving and work today  Objective: See treatment diary below      Assessment: Reviewed HEP, pain with rotation and lat flx R today  Initiated AAROM for shoulder motion/scapular motion  Progressing with cervical ROM ex  Continue PT with goal of increasing activity level with minimal pain  Plan: Progress treatment as tolerated         Precautions: DM, hx neck surgery      Manuals            Reyes traction/STM/cervical mobs CRR STM/gentle stretch CRR  Tx, STM, stretch, joint mobs PA grade 2-3                                                  Neuro Re-Ed             Nerve glides                                                                                           Ther Ex             AROM c-spine 5x all 5x all           Axial ext seated and supine - 5x           scap elevate/retract - 10x/10x                                                                            Ther Activity             UBE  2' for/2' back           Wand flx/bench  10x/10x           Wand ext/IR  10x/10x                                     Modalities

## 2022-09-29 ENCOUNTER — OFFICE VISIT (OUTPATIENT)
Dept: PHYSICAL THERAPY | Facility: CLINIC | Age: 67
End: 2022-09-29
Payer: COMMERCIAL

## 2022-09-29 DIAGNOSIS — M43.6 NECK STIFFNESS: ICD-10-CM

## 2022-09-29 DIAGNOSIS — M54.12 CERVICAL RADICULAR PAIN: Primary | ICD-10-CM

## 2022-09-29 PROCEDURE — 97530 THERAPEUTIC ACTIVITIES: CPT | Performed by: PHYSICAL THERAPIST

## 2022-09-29 PROCEDURE — 97110 THERAPEUTIC EXERCISES: CPT | Performed by: PHYSICAL THERAPIST

## 2022-09-29 PROCEDURE — 97140 MANUAL THERAPY 1/> REGIONS: CPT | Performed by: PHYSICAL THERAPIST

## 2022-09-29 NOTE — PROGRESS NOTES
Daily Note     Today's date: 2022  Patient name: Jason Rubin  : 1955  MRN: [id number]  Referring provider: Ene West DO  Dx:   Encounter Diagnosis     ICD-10-CM    1  Cervical radicular pain  M54 12    2  Neck stiffness  M43 6                   Subjective: Pt seen by Dr Rivers Adjutant and put on "sinus medicine"  Pain range 5-7/10 today  Objective: See treatment diary below      Assessment: Progressing with cervical ROM ex  Initiated cervical stabilization exercise without increase in pain  Pt needs VC to maintain upright posture with exercise  Continue PT with goal of increasing activity level without pain  Plan: Progress treatment as tolerated         Precautions: DM, hx neck surgery      Manuals           Reyes traction/STM/cervical mobs CRR STM/gentle stretch CRR  Tx, STM, stretch, joint mobs PA grade 2-3 Gentle traction , gentle PA mobs grade 2  CRR                                                 Neuro Re-Ed             Nerve glides                                                                                           Ther Ex             AROM c-spine 5x all 5x all 5x all          Axial ext seated and supine - 5x 5x          scap elevate/retract - 10x/10x 10x/10x          Spinal stabilization  Reciprocal shoulder flx  abd   10x/10x                                                              Ther Activity             UBE  2' for/2' back 2 1/2 F/B          Wand flx/bench  10x/10x 10x/10x          Wand ext/IR  10x/10x 10x/10x                                    Modalities

## 2022-10-03 ENCOUNTER — OFFICE VISIT (OUTPATIENT)
Dept: PHYSICAL THERAPY | Facility: CLINIC | Age: 67
End: 2022-10-03
Payer: COMMERCIAL

## 2022-10-03 DIAGNOSIS — M43.6 NECK STIFFNESS: Primary | ICD-10-CM

## 2022-10-03 DIAGNOSIS — M54.12 CERVICAL RADICULAR PAIN: ICD-10-CM

## 2022-10-03 PROCEDURE — 97530 THERAPEUTIC ACTIVITIES: CPT | Performed by: PHYSICAL THERAPIST

## 2022-10-03 PROCEDURE — 97110 THERAPEUTIC EXERCISES: CPT | Performed by: PHYSICAL THERAPIST

## 2022-10-03 PROCEDURE — 97140 MANUAL THERAPY 1/> REGIONS: CPT | Performed by: PHYSICAL THERAPIST

## 2022-10-03 NOTE — PROGRESS NOTES
Daily Note     Today's date: 10/3/2022  Patient name: Tracy Cochran  : 1955  MRN: [id number]  Referring provider: Nicole Samayoa DO  Dx:   Encounter Diagnosis     ICD-10-CM    1  Neck stiffness  M43 6    2  Cervical radicular pain  M54 12        Start Time: 1630  Stop Time: 1710  Total time in clinic (min): 40 minutes    Subjective:Pt notes some achiness with damp cool weather  6/10 today  Objective: See treatment diary below      Assessment: Progressing with flexibility and cervical stabilization ex  Limited AROM c-spine persists, stiffness and pain with palpation R lower cervical paraspinals  Continue PT with goal of increasing activity level with minimal pain  Plan: Progress treatment as tolerated         Precautions: DM, hx neck surgery      Manuals 9/21 9/26 9/29 10/3         Reyes traction/STM/cervical mobs CRR STM/gentle stretch CRR  Tx, STM, stretch, joint mobs PA grade 2-3 Gentle traction , gentle PA mobs grade 2  CRR Gentle traction, PA mobs grade 2, gentle stretch  CRR                                                Neuro Re-Ed             Nerve glides                                                                                           Ther Ex             AROM c-spine 5x all 5x all 5x all 5x         Axial ext seated and supine - 5x 5x 5x         scap elevate/retract - 10x/10x 10x/10x 10x/10x         Spinal stabilization  Reciprocal shoulder flx  abd   10x/10x 10x/10x         scap retract, shoulder ext, row, ER    10x all 3 positions                                                Ther Activity             UBE  2' for/2' back 2 1/2 F/B 2 1/2 F/B         Wand flx/bench  10x/10x 10x/10x 10x/10x         Wand ext/IR  10x/10x 10x/10x 10x/10x                                   Modalities

## 2022-10-05 ENCOUNTER — OFFICE VISIT (OUTPATIENT)
Dept: PHYSICAL THERAPY | Facility: CLINIC | Age: 67
End: 2022-10-05
Payer: COMMERCIAL

## 2022-10-05 DIAGNOSIS — M43.6 NECK STIFFNESS: ICD-10-CM

## 2022-10-05 DIAGNOSIS — M54.12 CERVICAL RADICULAR PAIN: Primary | ICD-10-CM

## 2022-10-05 PROCEDURE — 97110 THERAPEUTIC EXERCISES: CPT | Performed by: PHYSICAL THERAPIST

## 2022-10-05 PROCEDURE — 97530 THERAPEUTIC ACTIVITIES: CPT | Performed by: PHYSICAL THERAPIST

## 2022-10-05 PROCEDURE — 97140 MANUAL THERAPY 1/> REGIONS: CPT | Performed by: PHYSICAL THERAPIST

## 2022-10-05 NOTE — PROGRESS NOTES
Daily Note     Today's date: 10/5/2022  Patient name: Varun Sims  : 1955  MRN: [id number]  Referring provider: Shital Fall DO  Dx:   Encounter Diagnosis     ICD-10-CM    1  Cervical radicular pain  M54 12    2  Neck stiffness  M43 6        Start Time: 5870  Stop Time: 1640  Total time in clinic (min): 45 minutes    Subjective: Patient said he is feeling okay today  He said that his pain is about a 6-7/10 today  Pt finished his course of prednisone and is a little more achy today  Objective: See treatment diary below      Assessment: Progressing with cervical flexibility, jeancarlos interventions, and neck stabilization ex  Pain B mid cervical spine B R > L today  Continue PT with goal of increasing activity level with minimal pain  Plan: Progress treatment as tolerated         Precautions: DM, hx neck surgery      Manuals 9/21 9/26 9/29 10/3 10/5        Jeancarlos traction/STM/cervical mobs CRR STM/gentle stretch CRR  Tx, STM, stretch, joint mobs PA grade 2-3 Gentle traction , gentle PA mobs grade 2  CRR Gentle traction, PA mobs grade 2, gentle stretch  CRR Gentle traction PA mobs grade 2, gentle stretch  CRR                                       FOTO        Neuro Re-Ed             Nerve glides                                                                                           Ther Ex             AROM c-spine 5x all 5x all 5x all 5x 5x all        Axial ext seated and supine - 5x 5x 5x 5x        scap elevate/retract - 10x/10x 10x/10x 10x/10x 10x/10x        Spinal stabilization  Reciprocal shoulder flx  abd   10x/10x 10x/10x #1  10x/10x        scap retract, shoulder ext, row, ER    10x all 3 positions 10x all 3 positions                                               Ther Activity             UBE  2' for/2' back 2 1/2 F/B 2 1/2 F/B 2 1/2 F/B        Wand flx/bench  10x/10x 10x/10x 10x/10x 10x/10x        Wand ext/IR  10x/10x 10x/10x 10x/10x 10x/10x                                  Modalities

## 2022-10-10 ENCOUNTER — APPOINTMENT (OUTPATIENT)
Dept: PHYSICAL THERAPY | Facility: CLINIC | Age: 67
End: 2022-10-10

## 2022-10-13 ENCOUNTER — OFFICE VISIT (OUTPATIENT)
Dept: PHYSICAL THERAPY | Facility: CLINIC | Age: 67
End: 2022-10-13
Payer: COMMERCIAL

## 2022-10-13 DIAGNOSIS — M54.12 CERVICAL RADICULAR PAIN: ICD-10-CM

## 2022-10-13 DIAGNOSIS — M43.6 NECK STIFFNESS: Primary | ICD-10-CM

## 2022-10-13 PROCEDURE — 97140 MANUAL THERAPY 1/> REGIONS: CPT | Performed by: PHYSICAL THERAPIST

## 2022-10-13 PROCEDURE — 97110 THERAPEUTIC EXERCISES: CPT | Performed by: PHYSICAL THERAPIST

## 2022-10-13 PROCEDURE — 97530 THERAPEUTIC ACTIVITIES: CPT | Performed by: PHYSICAL THERAPIST

## 2022-10-13 NOTE — PROGRESS NOTES
Daily Note     Today's date: 10/13/2022  Patient name: Marge Kirby  : 1955  MRN: [id number]  Referring provider: Priscilla Wong DO  Dx:   Encounter Diagnosis     ICD-10-CM    1  Neck stiffness  M43 6    2  Cervical radicular pain  M54 12        Start Time: 1630  Stop Time: 1705  Total time in clinic (min): 35 minutes    Subjective: Pt states he is more painful with the change in the weather and he has been ill   &/10 pain scale today  Objective: See treatment diary below      Assessment: Neck ROM remains stiff, limited in lateral flx both L/R  Progressed with cervical flexibility ex, reyes interventions, B shoulder strengthening, and neck stabilization ex  Pain R lower cervical paraspinals with palpation  Pt able to complete program without increase in pain, pain decreased from 7 to 5/10  Continue PT with goal of improving activity with decreased pain  Plan: Progress treatment as tolerated         Precautions: DM, hx neck surgery      Manuals 9/21 9/26 9/29 10/3 10/5 10/13       Reyes traction/STM/cervical mobs CRR STM/gentle stretch CRR  Tx, STM, stretch, joint mobs PA grade 2-3 Gentle traction , gentle PA mobs grade 2  CRR Gentle traction, PA mobs grade 2, gentle stretch  CRR Gentle traction PA mobs grade 2, gentle stretch  CRR Gentle traction grade 2, gentle stretch  CRR                                      FOTO        Neuro Re-Ed             Nerve glides                                                                                           Ther Ex             AROM c-spine 5x all 5x all 5x all 5x 5x all 5x       Axial ext seated and supine - 5x 5x 5x 5x 5x       scap elevate/retract - 10x/10x 10x/10x 10x/10x 10x/10x 10x/10x       Spinal stabilization  Reciprocal shoulder flx  abd   10x/10x 10x/10x #1  10x/10x 10x/10x 1#       scap retract, shoulder ext, row, ER    10x all 3 positions 10x all 3 positions 10x all positions RTB                                              Ther Activity UBE  2' for/2' back 2 1/2 F/B 2 1/2 F/B 2 1/2 F/B 2 1/2 F/B       Wand flx/bench  10x/10x 10x/10x 10x/10x 10x/10x 10x/10x       Wand ext/IR  10x/10x 10x/10x 10x/10x 10x/10x 10x/10x                                 Modalities

## 2022-10-17 ENCOUNTER — OFFICE VISIT (OUTPATIENT)
Dept: PHYSICAL THERAPY | Facility: CLINIC | Age: 67
End: 2022-10-17
Payer: COMMERCIAL

## 2022-10-17 DIAGNOSIS — M54.12 CERVICAL RADICULAR PAIN: Primary | ICD-10-CM

## 2022-10-17 DIAGNOSIS — M43.6 NECK STIFFNESS: ICD-10-CM

## 2022-10-17 PROCEDURE — 97530 THERAPEUTIC ACTIVITIES: CPT | Performed by: PHYSICAL THERAPIST

## 2022-10-17 PROCEDURE — 97140 MANUAL THERAPY 1/> REGIONS: CPT | Performed by: PHYSICAL THERAPIST

## 2022-10-17 PROCEDURE — 97110 THERAPEUTIC EXERCISES: CPT | Performed by: PHYSICAL THERAPIST

## 2022-10-17 NOTE — PROGRESS NOTES
Daily Note     Today's date: 10/17/2022  Patient name: Al Meade  : 1955  MRN: [id number]  Referring provider: Serenity Sanchez DO  Dx:   Encounter Diagnosis     ICD-10-CM    1  Cervical radicular pain  M54 12    2  Neck stiffness  M43 6        Start Time: 1630  Stop Time: 1524  Total time in clinic (min): 45 minutes    Subjective: Some pain continues /10 today  @ worst 7/10, @ best 10      Objective: See treatment diary below      Assessment:  Improved cervical rotation noted, pt states he is turning his head when driving with greater ease  Painful R/L lower cervical paraspinals  Progressing with neck flexibility ex, and cervical spinal stabilization ex  Continue PT with goal of increasing activity level with minimal pain  Plan: Progress treatment as tolerated         Precautions: DM, hx neck surgery      Manuals 9/21 9/26 9/29 10/3 10/5 10/13 10/17      Reyes traction/STM/cervical mobs CRR STM/gentle stretch CRR  Tx, STM, stretch, joint mobs PA grade 2-3 Gentle traction , gentle PA mobs grade 2  CRR Gentle traction, PA mobs grade 2, gentle stretch  CRR Gentle traction PA mobs grade 2, gentle stretch  CRR Gentle traction grade 2, gentle stretch  CRR Gentle traction grade 2 , neck stretch, STM  CRR                                     FOTO        Neuro Re-Ed             Nerve glides                                                                                           Ther Ex             AROM c-spine 5x all 5x all 5x all 5x 5x all 5x 5x      Axial ext seated and supine - 5x 5x 5x 5x 5x 5x      scap elevate/retract - 10x/10x 10x/10x 10x/10x 10x/10x 10x/10x 10x/10x      Spinal stabilization  Reciprocal shoulder flx  abd   10x/10x 10x/10x #1  10x/10x 10x/10x 1# 2 x 10 1# RTB      scap retract, shoulder ext, row, ER    10x all 3 positions 10x all 3 positions 10x all positions RTB 20x all positions      Lateral press       20x/20x L/R RTB                                Ther Activity UBE  2' for/2' back 2 1/2 F/B 2 1/2 F/B 2 1/2 F/B 2 1/2 F/B 3 for, 3 back      Wand flx/bench  10x/10x 10x/10x 10x/10x 10x/10x 10x/10x 10x/10x      Wand ext/IR  10x/10x 10x/10x 10x/10x 10x/10x 10x/10x 10x/10x                                Modalities

## 2022-10-19 ENCOUNTER — OFFICE VISIT (OUTPATIENT)
Dept: PHYSICAL THERAPY | Facility: CLINIC | Age: 67
End: 2022-10-19
Payer: COMMERCIAL

## 2022-10-19 DIAGNOSIS — M54.12 CERVICAL RADICULAR PAIN: Primary | ICD-10-CM

## 2022-10-19 DIAGNOSIS — M43.6 NECK STIFFNESS: ICD-10-CM

## 2022-10-19 PROCEDURE — 97530 THERAPEUTIC ACTIVITIES: CPT | Performed by: PHYSICAL THERAPIST

## 2022-10-19 PROCEDURE — 97140 MANUAL THERAPY 1/> REGIONS: CPT | Performed by: PHYSICAL THERAPIST

## 2022-10-19 PROCEDURE — 97110 THERAPEUTIC EXERCISES: CPT | Performed by: PHYSICAL THERAPIST

## 2022-10-19 NOTE — PROGRESS NOTES
Daily Note     Today's date: 10/19/2022  Patient name: Davi Cox  : 1955  MRN: [id number]  Referring provider: Dinh Cole DO  Dx:   Encounter Diagnosis     ICD-10-CM    1  Cervical radicular pain  M54 12    2  Neck stiffness  M43 6        Start Time: 8444  Stop Time: 1640  Total time in clinic (min): 45 minutes    Subjective: Patient said he is doing okay today  He said he feels like the neck is looser, but that his pain is a 6/10  He also said he took ibuprofen last night for his pain  Objective: See treatment diary below      Assessment: Progressing with flexibility and cervical stabilization ex  Tenderness R lower paracervical mm  Pt needs VC for proper form for ex  Continue PT with goal of increasing activity level with decreased pain  Plan: Progress treatment as tolerated         Precautions: DM, hx neck surgery      Manuals 9/21 9/26 9/29 10/3 10/5 10/13 10/17 10/19     Reyes traction/STM/cervical mobs CRR STM/gentle stretch CRR  Tx, STM, stretch, joint mobs PA grade 2-3 Gentle traction , gentle PA mobs grade 2  CRR Gentle traction, PA mobs grade 2, gentle stretch  CRR Gentle traction PA mobs grade 2, gentle stretch  CRR Gentle traction grade 2, gentle stretch  CRR Gentle traction grade 2 , neck stretch, STM  CRR Gentle traction grade2, neck stretch STM  CRR                                    FOTO        Neuro Re-Ed             Nerve glides                                                                                           Ther Ex             AROM c-spine 5x all 5x all 5x all 5x 5x all 5x 5x 5x     Axial ext seated and supine - 5x 5x 5x 5x 5x 5x 5x     scap elevate/retract - 10x/10x 10x/10x 10x/10x 10x/10x 10x/10x 10x/10x 10x/10x     Spinal stabilization  Reciprocal shoulder flx  abd   10x/10x 10x/10x #1  10x/10x 10x/10x 1# 2 x 10 1# RTB 2 x 10 1# RTB     scap retract, shoulder ext, row, ER    10x all 3 positions 10x all 3 positions 10x all positions RTB 20x all positions 20x all positions     Lateral press       20x/20x L/R RTB 20x/20x L/R RTB                               Ther Activity             UBE  2' for/2' back 2 1/2 F/B 2 1/2 F/B 2 1/2 F/B 2 1/2 F/B 3 for, 3 back 3 for, 3 back     Wand flx/bench  10x/10x 10x/10x 10x/10x 10x/10x 10x/10x 10x/10x 10x/10x     Wand ext/IR  10x/10x 10x/10x 10x/10x 10x/10x 10x/10x 10x/10x 10x/10x                               Modalities

## 2022-10-24 ENCOUNTER — OFFICE VISIT (OUTPATIENT)
Dept: PHYSICAL THERAPY | Facility: CLINIC | Age: 67
End: 2022-10-24
Payer: COMMERCIAL

## 2022-10-24 DIAGNOSIS — M54.12 CERVICAL RADICULAR PAIN: ICD-10-CM

## 2022-10-24 DIAGNOSIS — M43.6 NECK STIFFNESS: Primary | ICD-10-CM

## 2022-10-24 PROCEDURE — 97530 THERAPEUTIC ACTIVITIES: CPT | Performed by: PHYSICAL THERAPIST

## 2022-10-24 PROCEDURE — 97140 MANUAL THERAPY 1/> REGIONS: CPT | Performed by: PHYSICAL THERAPIST

## 2022-10-24 PROCEDURE — 97110 THERAPEUTIC EXERCISES: CPT | Performed by: PHYSICAL THERAPIST

## 2022-10-24 NOTE — PROGRESS NOTES
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  Pain range 5-7/10  Objective: See treatment diary below      Assessment: Continued program of neck flexibility and cervical spinal stabilization ex  Continued with B scapular and shoulder strengthening without pain  Tenderness present with B lower cervical paraspinals  Continue PT with goal of increasing activity level with less pain  Plan: Progress treatment as tolerated         Precautions: DM, hx neck surgery      Manuals 9/21 9/26 9/29 10/3 10/5 10/13 10/17 10/19 10/24    Reyes traction/STM/cervical mobs CRR STM/gentle stretch CRR  Tx, STM, stretch, joint mobs PA grade 2-3 Gentle traction , gentle PA mobs grade 2  CRR Gentle traction, PA mobs grade 2, gentle stretch  CRR Gentle traction PA mobs grade 2, gentle stretch  CRR Gentle traction grade 2, gentle stretch  CRR Gentle traction grade 2 , neck stretch, STM  CRR Gentle traction grade2, neck stretch STM  CRR Gentle traction, STM, stretch c-spine    CRR                                   FOTO        Neuro Re-Ed             Nerve glides                                                                                           Ther Ex             AROM c-spine 5x all 5x all 5x all 5x 5x all 5x 5x 5x 5x    Axial ext seated and supine - 5x 5x 5x 5x 5x 5x 5x 5x    scap elevate/retract - 10x/10x 10x/10x 10x/10x 10x/10x 10x/10x 10x/10x 10x/10x 10x/10x    Spinal stabilization  Reciprocal shoulder flx  abd   10x/10x 10x/10x #1  10x/10x 10x/10x 1# 2 x 10 1# RTB 2 x 10 1# RTB 2 x 10 1# L/R    scap retract, shoulder ext, row, ER 10x all 3 positions 10x all 3 positions 10x all positions RTB 20x all positions 20x all positions 20x all RTB    Lateral press       20x/20x L/R RTB 20x/20x L/R RTB 20x/20x RTB                              Ther Activity             UBE  2' for/2' back 2 1/2 F/B 2 1/2 F/B 2 1/2 F/B 2 1/2 F/B 3 for, 3 back 3 for, 3 back 3 for/3 back    Wand flx/bench  10x/10x 10x/10x 10x/10x 10x/10x 10x/10x 10x/10x 10x/10x 10x/10x    Wand ext/IR  10x/10x 10x/10x 10x/10x 10x/10x 10x/10x 10x/10x 10x/10x 10x/10x                              Modalities

## 2022-10-26 ENCOUNTER — EVALUATION (OUTPATIENT)
Dept: PHYSICAL THERAPY | Facility: CLINIC | Age: 67
End: 2022-10-26
Payer: COMMERCIAL

## 2022-10-26 DIAGNOSIS — M43.6 NECK STIFFNESS: Primary | ICD-10-CM

## 2022-10-26 DIAGNOSIS — M54.12 CERVICAL RADICULAR PAIN: ICD-10-CM

## 2022-10-26 PROCEDURE — 97164 PT RE-EVAL EST PLAN CARE: CPT | Performed by: PHYSICAL THERAPIST

## 2022-10-26 PROCEDURE — 97110 THERAPEUTIC EXERCISES: CPT | Performed by: PHYSICAL THERAPIST

## 2022-10-26 PROCEDURE — 97140 MANUAL THERAPY 1/> REGIONS: CPT | Performed by: PHYSICAL THERAPIST

## 2022-10-26 PROCEDURE — 97530 THERAPEUTIC ACTIVITIES: CPT | Performed by: PHYSICAL THERAPIST

## 2022-10-26 NOTE — LETTER
2022    Brandi Vee DO  [address] [place] [address]    Patient: Carl Ashton   YOB: 1955   Date of Visit: 10/26/2022     Encounter Diagnosis     ICD-10-CM    1  Neck stiffness  M43 6    2  Cervical radicular pain  M54 12        Dear Dr Catrachita Vazquez: Thank you for your recent referral of Carl Ashton  Please review the attached evaluation summary from Mukund's recent visit  Please verify that you agree with the plan of care by signing the attached order  If you have any questions or concerns, please do not hesitate to call  I sincerely appreciate the opportunity to share in the care of one of your patients and hope to have another opportunity to work with you in the near future  Sincerely,    Dimas Lovett, PT      Referring Provider:      I certify that I have read the below Plan of Care and certify the need for these services furnished under this plan of treatment while under my care  Brandi eVe DO  [address]  [facility] [address]  Via Fax: [phone]          PT Discharge    Today's date: 10/26/2022  Patient name: Carl Ashton  : 1955  MRN: [id number]  Referring provider: Naun Montejo DO  Dx:   Encounter Diagnosis     ICD-10-CM    1  Neck stiffness  M43 6    2  Cervical radicular pain  M54 12        Start Time: 1530  Stop Time: 1630  Total time in clinic (min): 60 minutes    Assessment  Assessment details: Pt is a 73year old male with chronic neck pain, hx of cervical fusion  Pain range 3-6/10  Cervical ROM min to mod limited all planes  No radiating sx's to UE's for past week  R shoulder abd/ER 4+/5  Improved function with prolonged sitting, and looking up; continues to avoid heavy weight  Notes improved ability to turn head when driving  NDI improved from 24% to 16% disability  DC to HEP    Impairments: abnormal or restricted ROM, activity intolerance, impaired physical strength, lacks appropriate home exercise program and pain with function  Functional limitations: min pain with looking up and sitting, avoids heavy weight  Symptom irritability: lowUnderstanding of Dx/Px/POC: fair   Prognosis: good    Goals  STG- 4 weeks 10/19/22  1  I HEP (MET)  2  Decrease pain range to 0-4/10 (NOT MET)  3  Increase AROM c-spine to mod limit all planes (MET)  4  Centralize R UE sx's (MET)    LTG- 8 weeks 22  1  Decrease pain range to 0-2/10 (NOT MET)  2  Increase AROM to min limit all planes ( progressing)  3  Improve NDI to under 12% disability (progressing, currently 16% disability)  4  Improve patient's awareness of posture and body mechanics in order to prevent re-occurrence of sx's (MET)      Plan  Plan details: DC to HEP  Patient would benefit from: PT eval and skilled physical therapy  Referral necessary: No  Planned modality interventions: thermotherapy: hydrocollator packs and cryotherapy  Planned therapy interventions: manual therapy, massage, neuromuscular re-education, therapeutic activities, therapeutic exercise, postural training, patient education and home exercise program  Frequency: 2x week  Plan of Care beginning date: 2022  Plan of Care expiration date: 2022  Treatment plan discussed with: patient        Subjective Evaluation    History of Present Illness  Date of onset: 7/15/2022  Mechanism of injury: 10/26/22-Pt notes some improvement in neck pain, and improved function, notes better able to turn head when driving  Pt reports long hx of neck pain with hx of discectomy/cervical fusion(level unknown)  Pain increasing over past few months  Pt had 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 (level unknown)  Pt referred for outpatient PT            Recurrent probem    Quality of life: good    Pain  Current pain ratin  At best pain ratin  At worst pain ratin  Quality: dull ache, pressure and tight  Aggravating factors: sitting and overhead activity  Progression: improved    Social Support  Steps to enter house: yes  2  Stairs in house: yes   14  Lives in: multiple-level home  Lives with: spouse    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[employer])  Hand dominance: right      Diagnostic Tests  X-ray: abnormal  Treatments  Previous treatment: physical therapy  Current treatment: physical therapy  Patient Goals  Patient goals for therapy: increased motion and decreased pain  Patient goal: decrease pain        Objective     Postural Observations  Seated posture: poor  Standing posture: poor        Tenderness     Additional Tenderness Details  Pain B lower cervical paraspinals R > L, pain B upper trap R > L    Neurological Testing     Sensation   Cervical/Thoracic   Left   Intact: light touch    Right   Intact: light touch    Additional Neurological Details  No radiating sx's to hands    Active Range of Motion   Cervical/Thoracic Spine       Cervical    Subcranial retraction:   Restriction level: minimal  Flexion:  Restriction level: minimal  Extension:  with pain Restriction level: minimal  Left lateral flexion:  with pain Restriction level: moderate  Right lateral flexion:  with pain Restriction level moderate  Left rotation:  with pain Restriction level: minimal  Right rotation:  with pain Restriction level: minimal    Joint Play     Comments: Not assessed, patient has cervical fusion    Strength/Myotome Testing     Left Shoulder     Planes of Motion   Flexion: 5   Extension: 5   Abduction: 5     Right Shoulder     Planes of Motion   Flexion: 5   Extension: 5   Abduction: 4+   External rotation at 0°: 4+     Left Elbow   Flexion: 5    Right Elbow   Flexion: 5    Left Wrist/Hand   Wrist extension: 5  Wrist flexion: 5  Thumb extension: 5    Right Wrist/Hand   Wrist extension: 5  Wrist flexion: 5  Thumb extension: 5    Additional Strength Details  intrinsics strong and symetrical; 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    Tests   Cervical   Negative vertical compression and cervical distraction  Left   Negative Spurling's Test B  Right   Negative Spurling's Test B  Left Shoulder   Negative ULTT1  Right Shoulder   Negative ULTT1  Lumbar   Negative vertical compression       General Comments:    Upper quarter screen   Elbow: unremarkable    Shoulder Comments   R shoulder abd, ER 4+/5    Wrist/Hand Comments  B DuPuytrens present B hands    Cervical/Thoracic Comments  9/21/22- NDI 24% disability  10/26/22- NDI 16% disability              Precautions: DM, hx neck surgery      Manuals 9/21 9/26 9/29 10/3 10/5 10/13 10/17 10/19 10/24 10/26   Reyes traction/STM/cervical mobs CRR STM/gentle stretch CRR  Tx, STM, stretch, joint mobs PA grade 2-3 Gentle traction , gentle PA mobs grade 2  CRR Gentle traction, PA mobs grade 2, gentle stretch  CRR Gentle traction PA mobs grade 2, gentle stretch  CRR Gentle traction grade 2, gentle stretch  CRR Gentle traction grade 2 , neck stretch, STM  CRR Gentle traction grade2, neck stretch STM  CRR Gentle traction, STM, stretch c-spine    CRR Gentle traction, STM, stretch c-spine    CRR                                  FOTO        Neuro Re-Ed             Nerve glides                                                                                           Ther Ex             AROM c-spine 5x all 5x all 5x all 5x 5x all 5x 5x 5x 5x 5x   Axial ext seated and supine - 5x 5x 5x 5x 5x 5x 5x 5x 5x   scap elevate/retract - 10x/10x 10x/10x 10x/10x 10x/10x 10x/10x 10x/10x 10x/10x 10x/10x 10x/10x   Spinal stabilization  Reciprocal shoulder flx  abd   10x/10x 10x/10x #1  10x/10x 10x/10x 1# 2 x 10 1# RTB 2 x 10 1# RTB 2 x 10 1# L/R 2 x 10 1# L/R   scap retract, shoulder ext, row, ER    10x all 3 positions 10x all 3 positions 10x all positions RTB 20x all positions 20x all positions 20x all RTB 20x all RTB   Lateral press       20x/20x L/R RTB 20x/20x L/R RTB 20x/20x RTB 20x/20x RTB                             Ther Activity UBE  2' for/2' back 2 1/2 F/B 2 1/2 F/B 2 1/2 F/B 2 1/2 F/B 3 for, 3 back 3 for, 3 back 3 for/3 back 3 for/3 back   Wand flx/bench  10x/10x 10x/10x 10x/10x 10x/10x 10x/10x 10x/10x 10x/10x 10x/10x 10x/10x   Wand ext/IR  10x/10x 10x/10x 10x/10x 10x/10x 10x/10x 10x/10x 10x/10x 10x/10x 10x/10x                             Modalities

## 2022-10-26 NOTE — PROGRESS NOTES
PT Discharge    Today's date: 10/26/2022  Patient name: Lang Palacios  : 1955  MRN: [id number]  Referring provider: Vane Baumann DO  Dx:   Encounter Diagnosis     ICD-10-CM    1  Neck stiffness  M43 6    2  Cervical radicular pain  M54 12        Start Time: 1530  Stop Time: 1630  Total time in clinic (min): 60 minutes    Assessment  Assessment details: Pt is a 73year old male with chronic neck pain, hx of cervical fusion  Pain range 3-6/10  Cervical ROM min to mod limited all planes  No radiating sx's to UE's for past week  R shoulder abd/ER 4+/5  Improved function with prolonged sitting, and looking up; continues to avoid heavy weight  Notes improved ability to turn head when driving  NDI improved from 24% to 16% disability  DC to HEP  Impairments: abnormal or restricted ROM, activity intolerance, impaired physical strength, lacks appropriate home exercise program and pain with function  Functional limitations: min pain with looking up and sitting, avoids heavy weight  Symptom irritability: lowUnderstanding of Dx/Px/POC: fair   Prognosis: good    Goals  STG- 4 weeks 10/19/22  1  I HEP (MET)  2  Decrease pain range to 0-4/10 (NOT MET)  3  Increase AROM c-spine to mod limit all planes (MET)  4  Centralize R UE sx's (MET)    LTG- 8 weeks 22  1  Decrease pain range to 0-2/10 (NOT MET)  2  Increase AROM to min limit all planes ( progressing)  3  Improve NDI to under 12% disability (progressing, currently 16% disability)  4   Improve patient's awareness of posture and body mechanics in order to prevent re-occurrence of sx's (MET)      Plan  Plan details: DC to HEP  Patient would benefit from: PT eval and skilled physical therapy  Referral necessary: No  Planned modality interventions: thermotherapy: hydrocollator packs and cryotherapy  Planned therapy interventions: manual therapy, massage, neuromuscular re-education, therapeutic activities, therapeutic exercise, postural training, patient education and home exercise program  Frequency: 2x week  Plan of Care beginning date: 2022  Plan of Care expiration date: 2022  Treatment plan discussed with: patient        Subjective Evaluation    History of Present Illness  Date of onset: 7/15/2022  Mechanism of injury: 10/26/22-Pt notes some improvement in neck pain, and improved function, notes better able to turn head when driving  Pt reports long hx of neck pain with hx of discectomy/cervical fusion(level unknown)  Pain increasing over past few months  Pt had 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 (level unknown)  Pt referred for outpatient PT            Recurrent probem    Quality of life: good    Pain  Current pain ratin  At best pain ratin  At worst pain ratin  Quality: dull ache, pressure and tight  Aggravating factors: sitting and overhead activity  Progression: improved    Social Support  Steps to enter house: yes  2  Stairs in house: yes   14  Lives in: multiple-level home  Lives with: spouse    Employment status: working ([employer]/ work at [employer])  Hand dominance: right      Diagnostic Tests  X-ray: abnormal  Treatments  Previous treatment: physical therapy  Current treatment: physical therapy  Patient Goals  Patient goals for therapy: increased motion and decreased pain  Patient goal: decrease pain        Objective     Postural Observations  Seated posture: poor  Standing posture: poor        Tenderness     Additional Tenderness Details  Pain B lower cervical paraspinals R > L, pain B upper trap R > L    Neurological Testing     Sensation   Cervical/Thoracic   Left   Intact: light touch    Right   Intact: light touch    Additional Neurological Details  No radiating sx's to hands    Active Range of Motion   Cervical/Thoracic Spine       Cervical    Subcranial retraction:   Restriction level: minimal  Flexion:  Restriction level: minimal  Extension:  with pain Restriction level: minimal  Left lateral flexion:  with pain Restriction level: moderate  Right lateral flexion:  with pain Restriction level moderate  Left rotation:  with pain Restriction level: minimal  Right rotation:  with pain Restriction level: minimal    Joint Play     Comments: Not assessed, patient has cervical fusion    Strength/Myotome Testing     Left Shoulder     Planes of Motion   Flexion: 5   Extension: 5   Abduction: 5     Right Shoulder     Planes of Motion   Flexion: 5   Extension: 5   Abduction: 4+   External rotation at 0°: 4+     Left Elbow   Flexion: 5    Right Elbow   Flexion: 5    Left Wrist/Hand   Wrist extension: 5  Wrist flexion: 5  Thumb extension: 5    Right Wrist/Hand   Wrist extension: 5  Wrist flexion: 5  Thumb extension: 5    Additional Strength Details  intrinsics strong and symetrical; 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    Tests   Cervical   Negative vertical compression and cervical distraction  Left   Negative Spurling's Test B  Right   Negative Spurling's Test B  Left Shoulder   Negative ULTT1  Right Shoulder   Negative ULTT1  Lumbar   Negative vertical compression       General Comments:    Upper quarter screen   Elbow: unremarkable    Shoulder Comments   R shoulder abd, ER 4+/5    Wrist/Hand Comments  B DuPuytrens present B hands    Cervical/Thoracic Comments  9/21/22- NDI 24% disability  10/26/22- NDI 16% disability              Precautions: DM, hx neck surgery      Manuals 9/21 9/26 9/29 10/3 10/5 10/13 10/17 10/19 10/24 10/26   Reyes traction/STM/cervical mobs CRR STM/gentle stretch CRR  Tx, STM, stretch, joint mobs PA grade 2-3 Gentle traction , gentle PA mobs grade 2  CRR Gentle traction, PA mobs grade 2, gentle stretch  CRR Gentle traction PA mobs grade 2, gentle stretch  CRR Gentle traction grade 2, gentle stretch  CRR Gentle traction grade 2 , neck stretch, STM  CRR Gentle traction grade2, neck stretch STM  CRR Gentle traction, STM, stretch c-spine    CRR Gentle traction, STM, stretch c-spine    CRR                                  FOTO        Neuro Re-Ed             Nerve glides                                                                                           Ther Ex             AROM c-spine 5x all 5x all 5x all 5x 5x all 5x 5x 5x 5x 5x   Axial ext seated and supine - 5x 5x 5x 5x 5x 5x 5x 5x 5x   scap elevate/retract - 10x/10x 10x/10x 10x/10x 10x/10x 10x/10x 10x/10x 10x/10x 10x/10x 10x/10x   Spinal stabilization  Reciprocal shoulder flx  abd   10x/10x 10x/10x #1  10x/10x 10x/10x 1# 2 x 10 1# RTB 2 x 10 1# RTB 2 x 10 1# L/R 2 x 10 1# L/R   scap retract, shoulder ext, row, ER    10x all 3 positions 10x all 3 positions 10x all positions RTB 20x all positions 20x all positions 20x all RTB 20x all RTB   Lateral press       20x/20x L/R RTB 20x/20x L/R RTB 20x/20x RTB 20x/20x RTB                             Ther Activity             UBE  2' for/2' back 2 1/2 F/B 2 1/2 F/B 2 1/2 F/B 2 1/2 F/B 3 for, 3 back 3 for, 3 back 3 for/3 back 3 for/3 back   Wand flx/bench  10x/10x 10x/10x 10x/10x 10x/10x 10x/10x 10x/10x 10x/10x 10x/10x 10x/10x   Wand ext/IR  10x/10x 10x/10x 10x/10x 10x/10x 10x/10x 10x/10x 10x/10x 10x/10x 10x/10x                             Modalities

## 2022-12-13 PROBLEM — Z86.010 PERSONAL HISTORY OF COLONIC POLYPS: Status: ACTIVE | Noted: 2018-04-17

## 2022-12-13 PROBLEM — K21.9 GERD (GASTROESOPHAGEAL REFLUX DISEASE): Status: ACTIVE | Noted: 2022-12-13

## 2022-12-13 PROBLEM — M19.90 ARTHRITIS: Status: ACTIVE | Noted: 2022-12-13

## 2022-12-13 PROBLEM — K22.70 BARRETT ESOPHAGUS: Status: ACTIVE | Noted: 2018-04-17

## 2022-12-13 PROBLEM — E11.9 DIABETES MELLITUS (HCC): Status: ACTIVE | Noted: 2022-12-13

## 2023-11-27 ENCOUNTER — TELEPHONE (OUTPATIENT)
Age: 68
End: 2023-11-27

## 2023-11-27 NOTE — TELEPHONE ENCOUNTER
Pt called to see if she could get a sooner appt with us for colon/egd.  Soonest OV was 12/28/23/ Pt decided to stay with [facility]